# Patient Record
Sex: FEMALE
[De-identification: names, ages, dates, MRNs, and addresses within clinical notes are randomized per-mention and may not be internally consistent; named-entity substitution may affect disease eponyms.]

---

## 2018-02-28 ENCOUNTER — HOSPITAL ENCOUNTER (EMERGENCY)
Dept: HOSPITAL 42 - ED | Age: 38
Discharge: LEFT BEFORE DISCHARGE | End: 2018-02-28
Payer: COMMERCIAL

## 2018-02-28 VITALS — TEMPERATURE: 98.1 F | HEART RATE: 80 BPM

## 2018-02-28 VITALS
RESPIRATION RATE: 16 BRPM | SYSTOLIC BLOOD PRESSURE: 128 MMHG | OXYGEN SATURATION: 100 % | DIASTOLIC BLOOD PRESSURE: 67 MMHG

## 2018-02-28 VITALS — BODY MASS INDEX: 25.8 KG/M2

## 2018-02-28 DIAGNOSIS — M79.89: ICD-10-CM

## 2018-02-28 DIAGNOSIS — R07.9: Primary | ICD-10-CM

## 2018-02-28 LAB
ALBUMIN SERPL-MCNC: 4 G/DL (ref 3–4.8)
ALBUMIN/GLOB SERPL: 1.3 {RATIO} (ref 1.1–1.8)
ALT SERPL-CCNC: 36 U/L (ref 7–56)
APPEARANCE UR: CLEAR
APTT BLD: 27 SECONDS (ref 25.1–36.5)
AST SERPL-CCNC: 42 U/L (ref 14–36)
BASOPHILS # BLD AUTO: 0.04 K/MM3 (ref 0–2)
BASOPHILS NFR BLD: 0.6 % (ref 0–3)
BILIRUB UR-MCNC: NEGATIVE MG/DL
BUN SERPL-MCNC: 24 MG/DL (ref 7–21)
CALCIUM SERPL-MCNC: 9.9 MG/DL (ref 8.4–10.5)
COLOR UR: (no result)
D DIMER PPP FEU-MCNC: 238 NG/ML (ref 0–243)
EOSINOPHIL # BLD: 0.1 10*3/UL (ref 0–0.7)
EOSINOPHIL NFR BLD: 2 % (ref 1.5–5)
ERYTHROCYTE [DISTWIDTH] IN BLOOD BY AUTOMATED COUNT: 14.3 % (ref 11.5–14.5)
GFR NON-AFRICAN AMERICAN: > 60
GLUCOSE UR STRIP-MCNC: NEGATIVE MG/DL
GRANULOCYTES # BLD: 4.98 10*3/UL (ref 1.4–6.5)
GRANULOCYTES NFR BLD: 72.8 % (ref 50–68)
HGB BLD-MCNC: 11.3 G/DL (ref 12–16)
INR PPP: 0.97 (ref 0.93–1.08)
LEUKOCYTE ESTERASE UR-ACNC: NEGATIVE LEU/UL
LYMPHOCYTES # BLD: 1.5 10*3/UL (ref 1.2–3.4)
LYMPHOCYTES NFR BLD AUTO: 21.5 % (ref 22–35)
MCH RBC QN AUTO: 27 PG (ref 25–35)
MCHC RBC AUTO-ENTMCNC: 32.7 G/DL (ref 31–37)
MCV RBC AUTO: 82.8 FL (ref 80–105)
MONOCYTES # BLD AUTO: 0.2 10*3/UL (ref 0.1–0.6)
MONOCYTES NFR BLD: 3.1 % (ref 1–6)
PH UR STRIP: 7.5 [PH] (ref 4.7–8)
PLATELET # BLD: 167 10^3/UL (ref 120–450)
PMV BLD AUTO: 12.5 FL (ref 7–11)
PROT UR STRIP-MCNC: NEGATIVE MG/DL
PROTHROMBIN TIME: 11.1 SECONDS (ref 9.4–12.5)
RBC # BLD AUTO: 4.18 10^6/UL (ref 3.5–6.1)
RBC # UR STRIP: NEGATIVE /UL
SP GR UR STRIP: 1.01 (ref 1–1.03)
TROPONIN I SERPL-MCNC: < 0.01 NG/ML
URINE NITRATE: NEGATIVE
UROBILINOGEN UR STRIP-ACNC: 0.2 E.U./DL
WBC # BLD AUTO: 6.8 10^3/UL (ref 4.5–11)
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
## 2018-03-01 NOTE — CARD
--------------- APPROVED REPORT --------------





EKG Measurement

Heart [id number]

[id number]

[id number]

[id number]

[id number]



<Conclusion>

Normal sinus rhythm

Normal ECG ambulatory

## 2018-03-01 NOTE — US
PROCEDURE:  Left lower extremity venous US



HISTORY:

Leg pain and swelling. Evaluate for DVT.



PHYSICIAN(S):  Ronald Torrez MD.



TECHNIQUE:

Duplex sonography and color-flow Doppler with graded compression were 

used to evaluate the deep venous system of the left lower extremity. 



FINDINGS:

The visualized deep venous system of the left lower extremity is 

sonographically normal and compressible. Normal wave forms and 

augmentation are seen. There is no sonographic evidence for deep 

venous thrombosis in the visualized segments of the left lower 

extremity.



IMPRESSION:

1. No sonographic evidence for deep venous thrombosis in the 

visualized segments of the left lower extremity.

## 2018-04-12 ENCOUNTER — HOSPITAL ENCOUNTER (EMERGENCY)
Dept: HOSPITAL 42 - ED | Age: 38
LOS: 1 days | Discharge: HOME | End: 2018-04-13
Payer: COMMERCIAL

## 2018-04-12 VITALS — BODY MASS INDEX: 28.2 KG/M2

## 2018-04-12 DIAGNOSIS — R10.31: Primary | ICD-10-CM

## 2018-04-12 LAB
ALBUMIN SERPL-MCNC: 3.6 G/DL (ref 3–4.8)
ALBUMIN/GLOB SERPL: 1.3 {RATIO} (ref 1.1–1.8)
ALT SERPL-CCNC: 28 U/L (ref 7–56)
APPEARANCE UR: CLEAR
AST SERPL-CCNC: 30 U/L (ref 14–36)
BASOPHILS # BLD AUTO: 0.03 K/MM3 (ref 0–2)
BASOPHILS NFR BLD: 0.4 % (ref 0–3)
BILIRUB UR-MCNC: NEGATIVE MG/DL
BUN SERPL-MCNC: 20 MG/DL (ref 7–21)
CALCIUM SERPL-MCNC: 9.5 MG/DL (ref 8.4–10.5)
COLOR UR: (no result)
EOSINOPHIL # BLD: 0.2 10*3/UL (ref 0–0.7)
EOSINOPHIL NFR BLD: 3.3 % (ref 1.5–5)
ERYTHROCYTE [DISTWIDTH] IN BLOOD BY AUTOMATED COUNT: 14.2 % (ref 11.5–14.5)
GFR NON-AFRICAN AMERICAN: > 60
GLUCOSE UR STRIP-MCNC: NEGATIVE MG/DL
GRANULOCYTES # BLD: 4.89 10*3/UL (ref 1.4–6.5)
GRANULOCYTES NFR BLD: 66.5 % (ref 50–68)
HCG,QUALITATIVE URINE: NEGATIVE
HGB BLD-MCNC: 11.4 G/DL (ref 12–16)
LEUKOCYTE ESTERASE UR-ACNC: NEGATIVE LEU/UL
LIPASE SERPL-CCNC: 83 U/L (ref 23–300)
LYMPHOCYTES # BLD: 1.8 10*3/UL (ref 1.2–3.4)
LYMPHOCYTES NFR BLD AUTO: 24 % (ref 22–35)
MCH RBC QN AUTO: 26.3 PG (ref 25–35)
MCHC RBC AUTO-ENTMCNC: 33 G/DL (ref 31–37)
MCV RBC AUTO: 79.7 FL (ref 80–105)
MONOCYTES # BLD AUTO: 0.4 10*3/UL (ref 0.1–0.6)
MONOCYTES NFR BLD: 5.8 % (ref 1–6)
PH UR STRIP: 7 [PH] (ref 4.7–8)
PLATELET # BLD: 161 10^3/UL (ref 120–450)
PMV BLD AUTO: 12.1 FL (ref 7–11)
PROT UR STRIP-MCNC: NEGATIVE MG/DL
RBC # BLD AUTO: 4.33 10^6/UL (ref 3.5–6.1)
RBC # UR STRIP: NEGATIVE /UL
SP GR UR STRIP: 1.01 (ref 1–1.03)
UROBILINOGEN UR STRIP-ACNC: 0.2 E.U./DL
WBC # BLD AUTO: 7.4 10^3/UL (ref 4.5–11)

## 2018-04-12 PROCEDURE — 83690 ASSAY OF LIPASE: CPT

## 2018-04-12 PROCEDURE — 96375 TX/PRO/DX INJ NEW DRUG ADDON: CPT

## 2018-04-12 PROCEDURE — 74176 CT ABD & PELVIS W/O CONTRAST: CPT

## 2018-04-12 PROCEDURE — 81003 URINALYSIS AUTO W/O SCOPE: CPT

## 2018-04-12 PROCEDURE — 99283 EMERGENCY DEPT VISIT LOW MDM: CPT

## 2018-04-12 PROCEDURE — 85025 COMPLETE CBC W/AUTO DIFF WBC: CPT

## 2018-04-12 PROCEDURE — 96361 HYDRATE IV INFUSION ADD-ON: CPT

## 2018-04-12 PROCEDURE — 96374 THER/PROPH/DIAG INJ IV PUSH: CPT

## 2018-04-12 PROCEDURE — 84703 CHORIONIC GONADOTROPIN ASSAY: CPT

## 2018-04-12 PROCEDURE — 80053 COMPREHEN METABOLIC PANEL: CPT

## 2018-04-12 NOTE — CT
EXAM:

  CT Abdomen and Pelvis Without Intravenous Contrast



CLINICAL HISTORY:

  38 years old, female; Pain; Abdominal pain; Localized; Right lower quadrant 

(rlq); Prior surgery; Surgery type: (3) c-sections; Additional info: Rlq pain 

R/O appy



TECHNIQUE:

  Axial computed tomography images of the abdomen and pelvis without 

intravenous contrast.  All CT scans at this facility use one or more dose 

reduction techniques, viz.: automated exposure control; ma/kV adjustment per 

patient size (including targeted exams where dose is matched to indication; 

i.e. head); or iterative reconstruction technique.

  Coronal and sagittal reformatted images were created and reviewed.



COMPARISON:

  No relevant prior studies available.



FINDINGS:

  Lung bases:  Unremarkable.  No mass.  No consolidation.



 ABDOMEN:

  Liver:  The liver is within normal limits for this noncontrast study.

  Gallbladder and bile ducts:  Unremarkable.  No calcified stones.  No ductal 

dilation.

  Pancreas:  Unremarkable.  No ductal dilation.

  Spleen:  Unremarkable.  No splenomegaly.

  Adrenals:  Unremarkable.  No mass.

  Kidneys and ureters:  Unremarkable.  No obstructing stones.  No 

hydronephrosis.

  Stomach and bowel:  There is no wall thickening or pericolonic stranding to 

suggest colitis.  No obstruction.

  Appendix:  A normal appendix is identified.



 PELVIS:

  Bladder:  Unremarkable.  No stones.

  Reproductive:  Unremarkable as visualized.



 ABDOMEN and PELVIS:

  Intraperitoneal space: Possible trace pelvic cul-de-sac free fluid. No free 

air.  No significant fluid collection.

  Bones/joints:  No acute fracture.  No dislocation.

  Soft tissues:  Unremarkable.

  Vasculature:  Unremarkable.  No abdominal aortic aneurysm.

  Lymph nodes:  Unremarkable.  No enlarged lymph nodes.



IMPRESSION:     

No evidence of an acute intra-abdominal or pelvic abnormality. 



No evidence of acute appendicitis.



Possible trace physiologic pelvic cul-de-sac free fluid.

## 2018-04-12 NOTE — ED PDOC
Arrival/HPI





<Navjot Cordon - Last Filed: 18 22:24>





- General


Historian: Patient





- History of Present Illness


Time/Duration: Other (see hpi)


Context: Home





<Sascha Patel - Last Filed: 18 00:00>





- General


Chief Complaint: Abdominal Pain


Time Seen by Provider: 18 19:07





- History of Present Illness


Narrative History of Present Illness (Text): 





18 19:07


Patient is not in her room.


18 19:20


This 37 yo female who denies pmh presents to this ED c/o generalized abdominal 

pain, urinary frequency since this morning.  Patient denies diarrhea, recent 

travel, sick contact, dysuria, hematuria, weakness, paresthesias, or abnormal 

gait.


 (Sascha Patel)





Past Medical History





- Provider Review


Nursing Documentation Reviewed: Yes





- Cardiac


Hx Cardiac Disorders: No





- Pulmonary


Hx Respiratory Disorders: No





- Neurological


Hx Neurological Disorder: No





- HEENT


Hx HEENT Disorder: No





- Renal


Hx Renal Disorder: No





- Endocrine/Metabolic


Hx Endocrine Disorders: No





- Hematological/Oncological


Hx Blood Disorders: No





- Integumentary


Hx Dermatological Disorder: No





- Musculoskeletal/Rheumatological


Hx Musculoskeletal Disorders: No





- Gastrointestinal


Hx Gastrointestinal Disorders: No





- Genitourinary/Gynecological


Hx Genitourinary Disorders: No





- Psychiatric


Hx Psychophysiologic Disorder: No


Hx Substance Use: No





- Surgical History


Hx  Section: Yes (x3)





- Anesthesia


Hx Anesthesia: Yes


Hx Anesthesia Reactions: No


Hx Malignant Hyperthermia: No





<Sascha Patel - Last Filed: 18 00:00>





Family/Social History





- Physician Review


Nursing Documentation Reviewed: Yes


Family/Social History: Other (noncontributory)


Smoking Status: Never Smoked


Hx Alcohol Use: No


Hx Substance Use: No





<Sascha Patel - Last Filed: 18 00:00>





Allergies/Home Meds





<Navjot Cordon - Last Filed: 18 22:24>





<Sascha Patel - Last Filed: 18 00:00>


Allergies/Adverse Reactions: 


Allergies





methylprednisolone Allergy (Verified 18 18:38)


 ANAPHYLAXIS








Home Medications: 


 Home Meds











 Medication  Instructions  Recorded  Confirmed


 


Ibuprofen [Motrin Tab] 800 mg PO PRN PRN 18














Review of Systems





- Review of Systems


Constitutional: Normal.  absent: Fatigue, Weight Change, Fevers


Eyes: Normal


ENT: Normal


Respiratory: Normal.  absent: SOB, Cough


Cardiovascular: Normal.  absent: Chest Pain, Palpitations


Gastrointestinal: Abdominal Pain, Nausea, Vomiting, Other (no rectal bleeding).

  absent: Constipation, Diarrhea


Genitourinary Female: Frequency, Hematuria.  absent: Dysuria, Vaginal Bleeding, 

Vaginal Discharge


Musculoskeletal: Normal.  absent: Back Pain


Skin: Normal.  absent: Rash


Neurological: Normal.  absent: Headache, Dizziness, Focal Weakness, Gait Changes

, Speech Changes, Facial Droop, Disequilibrium, Seizure


Endocrine: Normal


Hemo/Lymphatic: Normal


Psychiatric: Normal





<Patel,Nah P - Last Filed: 18 00:00>





Physical Exam


Temperature: Afebrile


Blood Pressure: Normal


Pulse: Regular


Respiratory Rate: Normal


Appearance: Positive for: Well-Appearing, Non-Toxic, Comfortable


Pain Distress: None


Mental Status: Positive for: Alert and Oriented X 3





- Systems Exam


Head: Present: Atraumatic, Normocephalic


Pupils: Present: PERRL


Extroacular Muscles: Present: EOMI


Conjunctiva: Present: Normal


Mouth: Present: Moist Mucous Membranes


Neck: Present: Normal Range of Motion


Respiratory/Chest: Present: Clear to Auscultation, Good Air Exchange.  No: 

Respiratory Distress, Accessory Muscle Use


Cardiovascular: Present: Regular Rate and Rhythm, Normal S1, S2.  No: Murmurs


Abdomen: Present: Tenderness (mild right lower quadrant abdominal tenderness).  

No: Distention, Peritoneal Signs, Rebound, Guarding, Hernias


Back: Present: Normal Inspection.  No: CVA Tenderness


Upper Extremity: Present: Normal Inspection, Normal ROM.  No: Cyanosis, Edema


Lower Extremity: Present: Normal Inspection, Normal ROM.  No: Edema


Neurological: Present: GCS=15, CN II-XII Intact, Speech Normal


Skin: Present: Warm, Dry, Normal Color.  No: Rashes


Psychiatric: Present: Alert, Oriented x 3, Normal Insight, Normal Concentration





<Patel,Nahim P - Last Filed: 18 00:00>


Vital Signs











  Temp Pulse Resp BP Pulse Ox


 


 18 00:15  97.9 F  88  16  122/80  99


 


 18 22:05   88  16  112/80  99


 


 18 20:15   82  16  120/70  99


 


 18 18:39  98.3 F  99 H  19  110/60  98














Medical Decision Making





<Navjot Cordon - Last Filed: 18 22:24>


Re-evaluation Time: 00:02


Reassessment Condition: Re-examined, Improved





- Lab Interpretations


I have reviewed the lab results: Yes


Interpretation: No clinic. lab abnormalty





<Sascha Patel - Last Filed: 18 00:00>


ED Course and Treatment: 





18 00:02


Re-evaluation.  Patient feels better.  Discussed results and plan with patient 

who expresses understanding.  All questions answered and there is agreement 

with the plan to discharge home with instructions.  Patient stable for 

discharge.  Return if symptoms persist or worsen.


Abdomen is soft, nt/nd.  Patient remained stable during the course of ED visit.

  Patient tolerate po fluids.  Patient was recommended to f/u pmd in 1-2 days, 

and to return to ED if symptoms worsen. (Sascha Patel)





- Lab Interpretations


Lab Results: 








 18 20:07 





 18 20:07 





 Lab Results





18 20:07: Sodium 136, Potassium 3.8, Chloride 105, Carbon Dioxide 24, 

Anion Gap 10, BUN 20, Creatinine 0.6 L, Est GFR ( Amer) > 60, Est GFR (

Non-Af Amer) > 60, Random Glucose 85, Calcium 9.5, Total Bilirubin < 0.1 L, AST 

30, ALT 28, Alkaline Phosphatase 83, Total Protein 6.5, Albumin 3.6, Globulin 

2.9, Albumin/Globulin Ratio 1.3, Lipase 83


18 20:07: WBC 7.4, RBC 4.33, Hgb 11.4 L, Hct 34.5 L, MCV 79.7 L D, MCH 

26.3, MCHC 33.0, RDW 14.2, Plt Count 161, MPV 12.1 H, Gran % 66.5, Lymph % (Auto

) 24.0, Mono % (Auto) 5.8, Eos % (Auto) 3.3, Baso % (Auto) 0.4, Gran # 4.89, 

Lymph # (Auto) 1.8, Mono # (Auto) 0.4, Eos # (Auto) 0.2, Baso # (Auto) 0.03


18 19:40: Urine Color Light yellow, Urine Appearance Clear, Urine pH 7.0, 

Ur Specific Gravity 1.010, Urine Protein Negative, Urine Glucose (UA) Negative, 

Urine Ketones Negative, Urine Blood Negative, Urine Nitrate Negative, Urine 

Bilirubin Negative, Urine Urobilinogen 0.2, Ur Leukocyte Esterase Negative, 

Urine HCG, Qual Negative











- RAD Interpretation


Narrative RAD Interpretations (Text): 





18 23:57


[facility] Division of Radiology  


 [address] [place] [address]  


 Tel. no. [phone]   


 


 


Patient Name: GUILLAUME CEDENO            Account #: [id number]  


 


 FINDINGS:  


   Lung bases:  Unremarkable.  No mass.  No consolidation.  


 


  ABDOMEN:  


   Liver:  The liver is within normal limits for this noncontrast study.  


   Gallbladder and bile ducts:  Unremarkable.  No calcified stones.  No ductal 

  


 dilation.  


   Pancreas:  Unremarkable.  No ductal dilation.  


   Spleen:  Unremarkable.  No splenomegaly.  


   Adrenals:  Unremarkable.  No mass.  


   Kidneys and ureters:  Unremarkable.  No obstructing stones.  No   


 hydronephrosis.  


   Stomach and bowel:  There is no wall thickening or pericolonic stranding to 

  


 suggest colitis.  No obstruction.  


   Appendix:  A normal appendix is identified.  


 


  PELVIS:  


   Bladder:  Unremarkable.  No stones.  


   Reproductive:  Unremarkable as visualized.  


 


  ABDOMEN and PELVIS:  


   Intraperitoneal space: Possible trace pelvic cul-de-sac free fluid. No free 

  


 air.  No significant fluid collection.  


   Bones/joints:  No acute fracture.  No dislocation.  


   Soft tissues:  Unremarkable.  


   Vasculature:  Unremarkable.  No abdominal aortic aneurysm.  


   Lymph nodes:  Unremarkable.  No enlarged lymph nodes.  


 


 IMPRESSION:       


 No evidence of an acute intra-abdominal or pelvic abnormality.   


 


 No evidence of acute appendicitis.  


 


 Possible trace physiologic pelvic cul-de-sac free fluid.  


  (Sascha Patel)


Radiology Orders: 








18 20:02


ABD & PELVIS W/O PO OR IV CONT [CT] Stat 














- Medication Orders


Current Medication Orders: 











Discontinued Medications





Famotidine (Pepcid)  20 mg IVP STAT STA


   Stop: 18 19:33


   Last Admin: 18 20:08  Dose: 20 mg





IVP Administration


 Document     18 20:08  MS  (Rec: 04/12/18 20:08  MS  [id number])


     Charges for Administration


      # of IVP Administrations                   1





Sodium Chloride (Sodium Chloride 0.9%)  1,000 mls @ 1,000 mls/hr IV .Q1H STA


   Stop: 18 20:31


   Last Admin: 18 20:06  Dose: 1,000 mls/hr





eMAR Start Stop


 Document     18 20:06  MS  (Rec: 18 20:08  MS  [id number])


     Intravenous Solution


      Start Date                                 18


      Start Time                                 20:08


      End Date                                   18


      End time                                   21:08


      Total Infusion Time                        60





Ketorolac Tromethamine (Toradol)  15 mg IVP STAT STA


   Stop: 18 19:37


   Last Admin: 18 20:08  Dose: 15 mg





MAR Pain Assessment


 Document     18 20:08  MS  (Rec: 18 20:08  MS  [id number])


     Pain Reassessment


      Is this a pain reassessment?               No


     Sleep


      Is patient sleeping during reassessment?   No


     Presence of Pain


      Presence of Pain                           Yes


     Pain Scale Used


      Pain Scale Used                            Numeric


     Location


      Upper or Lower                             Lower


      Pain Location Body Site                    Abdomen


     Description


      Description                                Intermittent


      Intensity of Pain at present               6


      Pain Behavior                              Withdrawal from Touch


                                                 Rubbing Site


IVP Administration


 Document     18 20:08  MS  (Rec: 18 20:08  MS  [id number])


     Charges for Administration


      # of IVP Administrations                   1





Ondansetron HCl (Zofran Inj)  4 mg IVP STAT STA


   Stop: 18 19:33


   Last Admin: 18 20:08  Dose: 4 mg





IVP Administration


 Document     18 20:08  MS  (Rec: 18 20:09  [id number])


     Charges for Administration


      # of IVP Administrations                   1














- PA / NP / Resident Statement


MD/ has reviewed & agrees with the documentation as recorded.


MD/ has examined the patient and agrees with the treatment plan.





<Navjot Cordon - Last Filed: 18 22:24>





Disposition/Present on Arrival





<Navjot Cordon - Last Filed: 18 22:24>





- Present on Arrival


Any Indicators Present on Arrival: No


History of DVT/PE: No


History of Uncontrolled Diabetes: No


Urinary Catheter: No


History of Decub. Ulcer: No


History Surgical Site Infection Following: None





- Disposition


Have Diagnosis and Disposition been Completed?: Yes


Disposition Time: 00:02


Patient Plan: Discharge





<Sascha Patel - Last Filed: 18 00:00>





- Disposition


Diagnosis: 


 Nonspecific abdominal pain





Disposition: HOME/ ROUTINE


Condition: GOOD


Discharge Instructions (ExitCare):  Acute Abdomen (Belly Pain), Adult (DC)


Additional Instructions: 


Call private doctor for follow up visit in 1- 2 days.  Take medication as 

instructed.  Return to emergency if symptoms worsen.  Have liquid diet for a 

couple of days.


Prescriptions: 


Dicyclomine [Dicyclomine HCl] 10 mg PO TID PRN #20 cap


 PRN Reason: Gi Distress


Famotidine [Pepcid] 40 mg PO DAILY #10 tablet


Referrals: 


Makenzie Otoole MD [Primary Care Provider] - Follow up with primary


Forms:  [facility] (English), WORK NOTE

## 2018-04-13 VITALS — OXYGEN SATURATION: 99 % | RESPIRATION RATE: 16 BRPM

## 2018-04-13 VITALS — HEART RATE: 88 BPM | TEMPERATURE: 97.9 F | SYSTOLIC BLOOD PRESSURE: 122 MMHG | DIASTOLIC BLOOD PRESSURE: 80 MMHG

## 2023-12-31 ENCOUNTER — APPOINTMENT (EMERGENCY)
Dept: CT IMAGING | Facility: HOSPITAL | Age: 43
End: 2023-12-31

## 2023-12-31 ENCOUNTER — HOSPITAL ENCOUNTER (EMERGENCY)
Facility: HOSPITAL | Age: 43
Discharge: HOME | End: 2023-12-31
Attending: EMERGENCY MEDICINE

## 2023-12-31 VITALS
RESPIRATION RATE: 18 BRPM | HEART RATE: 76 BPM | WEIGHT: 153.66 LBS | WEIGHT: 153.66 LBS | HEART RATE: 76 BPM | TEMPERATURE: 98 F | DIASTOLIC BLOOD PRESSURE: 63 MMHG | RESPIRATION RATE: 18 BRPM | DIASTOLIC BLOOD PRESSURE: 63 MMHG | OXYGEN SATURATION: 97 % | SYSTOLIC BLOOD PRESSURE: 108 MMHG | OXYGEN SATURATION: 97 % | TEMPERATURE: 98 F | SYSTOLIC BLOOD PRESSURE: 108 MMHG

## 2023-12-31 DIAGNOSIS — N23 RENAL COLIC ON LEFT SIDE: ICD-10-CM

## 2023-12-31 DIAGNOSIS — D21.9 FIBROID: ICD-10-CM

## 2023-12-31 DIAGNOSIS — N39.0 UTI (URINARY TRACT INFECTION): Primary | ICD-10-CM

## 2023-12-31 DIAGNOSIS — R11.0 NAUSEA: ICD-10-CM

## 2023-12-31 LAB
ALBUMIN SERPL BCP-MCNC: 3.9 G/DL (ref 3.5–5)
ALBUMIN SERPL BCP-MCNC: 3.9 G/DL (ref 3.5–5)
ALP SERPL-CCNC: 54 U/L (ref 34–104)
ALP SERPL-CCNC: 54 U/L (ref 34–104)
ALT SERPL W P-5'-P-CCNC: 16 U/L (ref 7–52)
ALT SERPL W P-5'-P-CCNC: 16 U/L (ref 7–52)
ANION GAP SERPL CALCULATED.3IONS-SCNC: 6 MMOL/L
ANION GAP SERPL CALCULATED.3IONS-SCNC: 6 MMOL/L
AST SERPL W P-5'-P-CCNC: 16 U/L (ref 13–39)
AST SERPL W P-5'-P-CCNC: 16 U/L (ref 13–39)
BACTERIA UR QL AUTO: ABNORMAL /HPF
BACTERIA UR QL AUTO: ABNORMAL /HPF
BASOPHILS # BLD AUTO: 0.04 THOUSANDS/ÂΜL (ref 0–0.1)
BASOPHILS # BLD AUTO: 0.04 THOUSANDS/ÂΜL (ref 0–0.1)
BASOPHILS NFR BLD AUTO: 1 % (ref 0–1)
BASOPHILS NFR BLD AUTO: 1 % (ref 0–1)
BILIRUB SERPL-MCNC: 0.28 MG/DL (ref 0.2–1)
BILIRUB SERPL-MCNC: 0.28 MG/DL (ref 0.2–1)
BILIRUB UR QL STRIP: NEGATIVE
BILIRUB UR QL STRIP: NEGATIVE
BUN SERPL-MCNC: 16 MG/DL (ref 5–25)
BUN SERPL-MCNC: 16 MG/DL (ref 5–25)
CALCIUM SERPL-MCNC: 9.3 MG/DL (ref 8.4–10.2)
CALCIUM SERPL-MCNC: 9.3 MG/DL (ref 8.4–10.2)
CHLORIDE SERPL-SCNC: 105 MMOL/L (ref 96–108)
CHLORIDE SERPL-SCNC: 105 MMOL/L (ref 96–108)
CLARITY UR: CLEAR
CLARITY UR: CLEAR
CO2 SERPL-SCNC: 25 MMOL/L (ref 21–32)
CO2 SERPL-SCNC: 25 MMOL/L (ref 21–32)
COLOR UR: ABNORMAL
COLOR UR: ABNORMAL
CREAT SERPL-MCNC: 0.64 MG/DL (ref 0.6–1.3)
CREAT SERPL-MCNC: 0.64 MG/DL (ref 0.6–1.3)
EOSINOPHIL # BLD AUTO: 0.12 THOUSAND/ÂΜL (ref 0–0.61)
EOSINOPHIL # BLD AUTO: 0.12 THOUSAND/ÂΜL (ref 0–0.61)
EOSINOPHIL NFR BLD AUTO: 2 % (ref 0–6)
EOSINOPHIL NFR BLD AUTO: 2 % (ref 0–6)
ERYTHROCYTE [DISTWIDTH] IN BLOOD BY AUTOMATED COUNT: 14.6 % (ref 11.6–15.1)
ERYTHROCYTE [DISTWIDTH] IN BLOOD BY AUTOMATED COUNT: 14.6 % (ref 11.6–15.1)
EXT PREGNANCY TEST URINE: NEGATIVE
EXT PREGNANCY TEST URINE: NEGATIVE
EXT. CONTROL: NORMAL
EXT. CONTROL: NORMAL
GFR SERPL CREATININE-BSD FRML MDRD: 110 ML/MIN/1.73SQ M
GFR SERPL CREATININE-BSD FRML MDRD: 110 ML/MIN/1.73SQ M
GLUCOSE SERPL-MCNC: 82 MG/DL (ref 65–140)
GLUCOSE SERPL-MCNC: 82 MG/DL (ref 65–140)
GLUCOSE UR STRIP-MCNC: ABNORMAL MG/DL
GLUCOSE UR STRIP-MCNC: ABNORMAL MG/DL
HCT VFR BLD AUTO: 33.8 % (ref 34.8–46.1)
HCT VFR BLD AUTO: 33.8 % (ref 34.8–46.1)
HGB BLD-MCNC: 10.8 G/DL (ref 11.5–15.4)
HGB BLD-MCNC: 10.8 G/DL (ref 11.5–15.4)
HGB UR QL STRIP.AUTO: NEGATIVE
HGB UR QL STRIP.AUTO: NEGATIVE
IMM GRANULOCYTES # BLD AUTO: 0.02 THOUSAND/UL (ref 0–0.2)
IMM GRANULOCYTES # BLD AUTO: 0.02 THOUSAND/UL (ref 0–0.2)
IMM GRANULOCYTES NFR BLD AUTO: 0 % (ref 0–2)
IMM GRANULOCYTES NFR BLD AUTO: 0 % (ref 0–2)
KETONES UR STRIP-MCNC: ABNORMAL MG/DL
KETONES UR STRIP-MCNC: ABNORMAL MG/DL
LEUKOCYTE ESTERASE UR QL STRIP: NEGATIVE
LEUKOCYTE ESTERASE UR QL STRIP: NEGATIVE
LIPASE SERPL-CCNC: 26 U/L (ref 11–82)
LIPASE SERPL-CCNC: 26 U/L (ref 11–82)
LYMPHOCYTES # BLD AUTO: 1.36 THOUSANDS/ÂΜL (ref 0.6–4.47)
LYMPHOCYTES # BLD AUTO: 1.36 THOUSANDS/ÂΜL (ref 0.6–4.47)
LYMPHOCYTES NFR BLD AUTO: 18 % (ref 14–44)
LYMPHOCYTES NFR BLD AUTO: 18 % (ref 14–44)
MCH RBC QN AUTO: 25.9 PG (ref 26.8–34.3)
MCH RBC QN AUTO: 25.9 PG (ref 26.8–34.3)
MCHC RBC AUTO-ENTMCNC: 32 G/DL (ref 31.4–37.4)
MCHC RBC AUTO-ENTMCNC: 32 G/DL (ref 31.4–37.4)
MCV RBC AUTO: 81 FL (ref 82–98)
MCV RBC AUTO: 81 FL (ref 82–98)
MONOCYTES # BLD AUTO: 0.62 THOUSAND/ÂΜL (ref 0.17–1.22)
MONOCYTES # BLD AUTO: 0.62 THOUSAND/ÂΜL (ref 0.17–1.22)
MONOCYTES NFR BLD AUTO: 8 % (ref 4–12)
MONOCYTES NFR BLD AUTO: 8 % (ref 4–12)
MUCOUS THREADS UR QL AUTO: ABNORMAL
MUCOUS THREADS UR QL AUTO: ABNORMAL
NEUTROPHILS # BLD AUTO: 5.63 THOUSANDS/ÂΜL (ref 1.85–7.62)
NEUTROPHILS # BLD AUTO: 5.63 THOUSANDS/ÂΜL (ref 1.85–7.62)
NEUTS SEG NFR BLD AUTO: 71 % (ref 43–75)
NEUTS SEG NFR BLD AUTO: 71 % (ref 43–75)
NITRITE UR QL STRIP: POSITIVE
NITRITE UR QL STRIP: POSITIVE
NON-SQ EPI CELLS URNS QL MICRO: ABNORMAL /HPF
NON-SQ EPI CELLS URNS QL MICRO: ABNORMAL /HPF
NRBC BLD AUTO-RTO: 0 /100 WBCS
NRBC BLD AUTO-RTO: 0 /100 WBCS
PH UR STRIP.AUTO: 7 [PH] (ref 4.5–8)
PH UR STRIP.AUTO: 7 [PH] (ref 4.5–8)
PLATELET # BLD AUTO: 149 THOUSANDS/UL (ref 149–390)
PLATELET # BLD AUTO: 149 THOUSANDS/UL (ref 149–390)
PMV BLD AUTO: 12.3 FL (ref 8.9–12.7)
PMV BLD AUTO: 12.3 FL (ref 8.9–12.7)
POTASSIUM SERPL-SCNC: 3.6 MMOL/L (ref 3.5–5.3)
POTASSIUM SERPL-SCNC: 3.6 MMOL/L (ref 3.5–5.3)
PROT SERPL-MCNC: 6.3 G/DL (ref 6.4–8.4)
PROT SERPL-MCNC: 6.3 G/DL (ref 6.4–8.4)
PROT UR STRIP-MCNC: ABNORMAL MG/DL
PROT UR STRIP-MCNC: ABNORMAL MG/DL
RBC # BLD AUTO: 4.17 MILLION/UL (ref 3.81–5.12)
RBC # BLD AUTO: 4.17 MILLION/UL (ref 3.81–5.12)
RBC #/AREA URNS AUTO: ABNORMAL /HPF
RBC #/AREA URNS AUTO: ABNORMAL /HPF
SODIUM SERPL-SCNC: 136 MMOL/L (ref 135–147)
SODIUM SERPL-SCNC: 136 MMOL/L (ref 135–147)
SP GR UR STRIP.AUTO: 1.02 (ref 1–1.03)
SP GR UR STRIP.AUTO: 1.02 (ref 1–1.03)
UROBILINOGEN UR QL STRIP.AUTO: 1 E.U./DL
UROBILINOGEN UR QL STRIP.AUTO: 1 E.U./DL
WBC # BLD AUTO: 7.79 THOUSAND/UL (ref 4.31–10.16)
WBC # BLD AUTO: 7.79 THOUSAND/UL (ref 4.31–10.16)
WBC #/AREA URNS AUTO: ABNORMAL /HPF
WBC #/AREA URNS AUTO: ABNORMAL /HPF

## 2023-12-31 PROCEDURE — 74177 CT ABD & PELVIS W/CONTRAST: CPT

## 2023-12-31 PROCEDURE — 81025 URINE PREGNANCY TEST: CPT | Performed by: EMERGENCY MEDICINE

## 2023-12-31 PROCEDURE — 36415 COLL VENOUS BLD VENIPUNCTURE: CPT | Performed by: EMERGENCY MEDICINE

## 2023-12-31 PROCEDURE — 96365 THER/PROPH/DIAG IV INF INIT: CPT

## 2023-12-31 PROCEDURE — 99283 EMERGENCY DEPT VISIT LOW MDM: CPT

## 2023-12-31 PROCEDURE — 81001 URINALYSIS AUTO W/SCOPE: CPT

## 2023-12-31 PROCEDURE — 96375 TX/PRO/DX INJ NEW DRUG ADDON: CPT

## 2023-12-31 PROCEDURE — G1004 CDSM NDSC: HCPCS

## 2023-12-31 PROCEDURE — 83690 ASSAY OF LIPASE: CPT | Performed by: EMERGENCY MEDICINE

## 2023-12-31 PROCEDURE — 85025 COMPLETE CBC W/AUTO DIFF WBC: CPT | Performed by: EMERGENCY MEDICINE

## 2023-12-31 PROCEDURE — 80053 COMPREHEN METABOLIC PANEL: CPT | Performed by: EMERGENCY MEDICINE

## 2023-12-31 PROCEDURE — 99285 EMERGENCY DEPT VISIT HI MDM: CPT | Performed by: EMERGENCY MEDICINE

## 2023-12-31 RX ORDER — IBUPROFEN 600 MG/1
600 TABLET ORAL EVERY 8 HOURS PRN
Qty: 15 TABLET | Refills: 0 | Status: SHIPPED | OUTPATIENT
Start: 2023-12-31

## 2023-12-31 RX ORDER — ONDANSETRON 2 MG/ML
4 INJECTION INTRAMUSCULAR; INTRAVENOUS ONCE
Status: COMPLETED | OUTPATIENT
Start: 2023-12-31 | End: 2023-12-31

## 2023-12-31 RX ORDER — ONDANSETRON 4 MG/1
4 TABLET, ORALLY DISINTEGRATING ORAL EVERY 8 HOURS PRN
Qty: 10 TABLET | Refills: 0 | Status: SHIPPED | OUTPATIENT
Start: 2023-12-31

## 2023-12-31 RX ORDER — KETOROLAC TROMETHAMINE 30 MG/ML
15 INJECTION, SOLUTION INTRAMUSCULAR; INTRAVENOUS ONCE
Status: COMPLETED | OUTPATIENT
Start: 2023-12-31 | End: 2023-12-31

## 2023-12-31 RX ADMIN — ONDANSETRON 4 MG: 2 INJECTION INTRAMUSCULAR; INTRAVENOUS at 12:17

## 2023-12-31 RX ADMIN — SODIUM CHLORIDE, SODIUM LACTATE, POTASSIUM CHLORIDE, AND CALCIUM CHLORIDE 1000 ML: .6; .31; .03; .02 INJECTION, SOLUTION INTRAVENOUS at 12:19

## 2023-12-31 RX ADMIN — KETOROLAC TROMETHAMINE 15 MG: 30 INJECTION, SOLUTION INTRAMUSCULAR; INTRAVENOUS at 12:18

## 2023-12-31 RX ADMIN — IOHEXOL 100 ML: 350 INJECTION, SOLUTION INTRAVENOUS at 12:58

## 2023-12-31 NOTE — Clinical Note
Fiorella David was seen and treated in our emergency department on 12/31/2023.                Diagnosis:     Fiorella  .    She may return on this date: 01/02/2024         If you have any questions or concerns, please don't hesitate to call.      Marquise Dewitt MD    ______________________________           _______________          _______________  Hospital Representative                              Date                                Time

## 2023-12-31 NOTE — ED PROVIDER NOTES
History  Chief Complaint   Patient presents with    Flu Symptoms     Patient has body aches since yesterday along with nausea and dizziness.     HPI  Patient with bodyaches since she states yesterday along with some nausea and feeling lightheaded with decreased p.o. intake.  No vomiting or diarrhea.  No fevers.  She has no upper respiratory symptoms, not shortness of breath and no cough.  Has been having left flank pain was seen at another hospital and external review showed that they treated her for UTI as she has been having some dysuria.  External review of the chart shows the culture from few days ago had no growth.  None       History reviewed. No pertinent past medical history.    History reviewed. No pertinent surgical history.    History reviewed. No pertinent family history.  I have reviewed and agree with the history as documented.    E-Cigarette/Vaping    E-Cigarette Use Never User      E-Cigarette/Vaping Substances     Social History     Tobacco Use    Smoking status: Never    Smokeless tobacco: Never   Vaping Use    Vaping status: Never Used   Substance Use Topics    Alcohol use: Never    Drug use: Never       Review of Systems    Physical Exam  Physical Exam  Vitals and nursing note reviewed.   Constitutional:       General: She is not in acute distress.     Appearance: Normal appearance. She is well-developed. She is not ill-appearing, toxic-appearing or diaphoretic.   HENT:      Head: Normocephalic and atraumatic.      Right Ear: Hearing normal. No drainage or swelling.      Left Ear: Hearing normal. No drainage or swelling.   Eyes:      General: Lids are normal.         Right eye: No discharge.         Left eye: No discharge.      Conjunctiva/sclera: Conjunctivae normal.   Neck:      Vascular: No JVD.      Trachea: Trachea normal.   Cardiovascular:      Rate and Rhythm: Normal rate and regular rhythm.      Pulses: Normal pulses.      Heart sounds: Normal heart sounds. No murmur heard.     No  friction rub. No gallop.   Pulmonary:      Effort: Pulmonary effort is normal. No respiratory distress.      Breath sounds: Normal breath sounds. No stridor. No wheezing or rales.   Chest:      Chest wall: No tenderness.   Abdominal:      Palpations: Abdomen is soft.      Tenderness: There is no abdominal tenderness. There is left CVA tenderness. There is no right CVA tenderness, guarding or rebound.      Comments: Very very mild left CVA tenderness   Musculoskeletal:         General: Normal range of motion.      Cervical back: Normal range of motion.      Right lower leg: No edema.      Left lower leg: No edema.   Skin:     General: Skin is warm and dry.      Coloration: Skin is not pale.      Findings: No rash.   Neurological:      General: No focal deficit present.      Mental Status: She is alert.      GCS: GCS eye subscore is 4. GCS verbal subscore is 5. GCS motor subscore is 6.      Sensory: No sensory deficit.      Motor: No weakness or abnormal muscle tone.   Psychiatric:         Mood and Affect: Mood normal.         Speech: Speech normal.         Behavior: Behavior is cooperative.         Vital Signs  ED Triage Vitals [12/31/23 1116]   Temperature Pulse Respirations Blood Pressure SpO2   98 °F (36.7 °C) 89 18 128/58 98 %      Temp Source Heart Rate Source Patient Position - Orthostatic VS BP Location FiO2 (%)   Oral Monitor Sitting Right arm --      Pain Score       9           Vitals:    12/31/23 1116 12/31/23 1230   BP: 128/58 108/63   Pulse: 89 76   Patient Position - Orthostatic VS: Sitting Sitting         Visual Acuity      ED Medications  Medications   lactated ringers bolus 1,000 mL (0 mL Intravenous Stopped 12/31/23 1325)   ondansetron (ZOFRAN) injection 4 mg (4 mg Intravenous Given 12/31/23 1217)   ketorolac (TORADOL) injection 15 mg (15 mg Intravenous Given 12/31/23 1218)   iohexol (OMNIPAQUE) 350 MG/ML injection (MULTI-DOSE) 100 mL (100 mL Intravenous Given 12/31/23 1258)       Diagnostic  Studies  Results Reviewed       Procedure Component Value Units Date/Time    Comprehensive metabolic panel [[id number]]  (Abnormal) Collected: 12/31/23 1216    Lab Status: Final result Specimen: Blood from Arm, Right Updated: 12/31/23 1303     Sodium 136 mmol/L      Potassium 3.6 mmol/L      Chloride 105 mmol/L      CO2 25 mmol/L      ANION GAP 6 mmol/L      BUN 16 mg/dL      Creatinine 0.64 mg/dL      Glucose 82 mg/dL      Calcium 9.3 mg/dL      AST 16 U/L      ALT 16 U/L      Alkaline Phosphatase 54 U/L      Total Protein 6.3 g/dL      Albumin 3.9 g/dL      Total Bilirubin 0.28 mg/dL      eGFR 110 ml/min/1.73sq m     Narrative:      National Kidney Disease Foundation guidelines for Chronic Kidney Disease (CKD):     Stage 1 with normal or high GFR (GFR > 90 mL/min/1.73 square meters)    Stage 2 Mild CKD (GFR = 60-89 mL/min/1.73 square meters)    Stage 3A Moderate CKD (GFR = 45-59 mL/min/1.73 square meters)    Stage 3B Moderate CKD (GFR = 30-44 mL/min/1.73 square meters)    Stage 4 Severe CKD (GFR = 15-29 mL/min/1.73 square meters)    Stage 5 End Stage CKD (GFR <15 mL/min/1.73 square meters)  Note: GFR calculation is accurate only with a steady state creatinine    Lipase [[id number]]  (Normal) Collected: 12/31/23 1216    Lab Status: Final result Specimen: Blood from Arm, Right Updated: 12/31/23 1303     Lipase 26 u/L     Urine Microscopic [[id number]]  (Abnormal) Collected: 12/31/23 1208    Lab Status: Final result Specimen: Urine, Clean Catch Updated: 12/31/23 1247     RBC, UA 1-2 /hpf      WBC, UA 1-2 /hpf      Epithelial Cells Occasional /hpf      Bacteria, UA Occasional /hpf      MUCUS THREADS Innumerable    CBC and differential [[id number]]  (Abnormal) Collected: 12/31/23 1216    Lab Status: Final result Specimen: Blood from Arm, Right Updated: 12/31/23 1227     WBC 7.79 Thousand/uL      RBC 4.17 Million/uL      Hemoglobin 10.8 g/dL      Hematocrit 33.8 %      MCV 81 fL      MCH 25.9 pg      MCHC 32.0 g/dL      RDW  14.6 %      MPV 12.3 fL      Platelets 149 Thousands/uL      nRBC 0 /100 WBCs      Neutrophils Relative 71 %      Immat GRANS % 0 %      Lymphocytes Relative 18 %      Monocytes Relative 8 %      Eosinophils Relative 2 %      Basophils Relative 1 %      Neutrophils Absolute 5.63 Thousands/µL      Immature Grans Absolute 0.02 Thousand/uL      Lymphocytes Absolute 1.36 Thousands/µL      Monocytes Absolute 0.62 Thousand/µL      Eosinophils Absolute 0.12 Thousand/µL      Basophils Absolute 0.04 Thousands/µL     POCT pregnancy, urine [[id number]]  (Normal) Resulted: 12/31/23 1211    Lab Status: Final result Updated: 12/31/23 1211     EXT Preg Test, Ur Negative     Control Valid    Urine Macroscopic, POC [[id number]]  (Abnormal) Collected: 12/31/23 1208    Lab Status: Final result Specimen: Urine Updated: 12/31/23 1210     Color, UA Orange     Clarity, UA Clear     pH, UA 7.0     Leukocytes, UA Negative     Nitrite, UA Positive     Protein, UA 30 (1+) mg/dl      Glucose,  (1/10%) mg/dl      Ketones, UA Trace mg/dl      Urobilinogen, UA 1.0 E.U./dl      Bilirubin, UA Negative     Occult Blood, UA Negative     Specific Gravity, UA 1.020    Narrative:      CLINITEK RESULT                   CT abdomen pelvis with contrast   ED Interpretation by Marquise Dewitt MD (12/31 8199)   I have reviewed the film, per my independent interpretation : no free air or obstruction.        Final Result by Karina Presley MD (12/31 1326)      1. No acute findings in the abdomen or pelvis to account for the patient's symptoms. No obstructive uropathy.      2. Enlarged heterogeneous uterus with 2.6 cm anterior exophytic fundal fibroid.                     Workstation performed: [id number]                    Procedures  Procedures         ED Course                               SBIRT 20yo+      Flowsheet Row Most Recent Value   Initial Alcohol Screen: US AUDIT-C     1. How often do you have a drink containing alcohol? 0 Filed at:  12/31/2023 1132   2. How many drinks containing alcohol do you have on a typical day you are drinking?  0 Filed at: 12/31/2023 1132   3a. Male UNDER 65: How often do you have five or more drinks on one occasion? 0 Filed at: 12/31/2023 1132   3b. FEMALE Any Age, or MALE 65+: How often do you have 4 or more drinks on one occassion? 0 Filed at: 12/31/2023 1132   Audit-C Score 0 Filed at: 12/31/2023 1132   MARIA C: How many times in the past year have you...    Used an illegal drug or used a prescription medication for non-medical reasons? Never Filed at: 12/31/2023 1132                      Medical Decision Making  Patient does not have a fever and the white count is not elevated she does not appear toxic.  Her nitrites are positive in the urine and given the fact that she has some dysuria will have her continue the antibiotics for now.  She seems to be describing renal colic.  There is no kidney stone there is no renal infarction and there is no evidence of Adis on the CT.  She does not have a fever no white count is not tachycardic.  She felt much better after the pain medication so we can continue that and she was hydrated here.  Will have her follow-up with her primary care doctor.  She was told about the fibroid on the CT scan which I do not think is causing any of this discomfort as it is relatively small but she can follow-up with her OB/GYN doctor for that.    Amount and/or Complexity of Data Reviewed  Labs: ordered.  Radiology: ordered.    Risk  Prescription drug management.             Disposition  Final diagnoses:   UTI (urinary tract infection)   Renal colic on left side   Nausea   Fibroid     Time reflects when diagnosis was documented in both MDM as applicable and the Disposition within this note       Time User Action Codes Description Comment    12/31/2023  2:50 PM Marquise Dewitt Add [N39.0] UTI (urinary tract infection)     12/31/2023  2:50 PM Marquise Dewitt Add [N23] Renal colic on left  side     12/31/2023  2:50 PM Marquise Dewitt Add [R11.0] Nausea     12/31/2023  2:53 PM Marquise Dewitt Add [D21.9] Fibroid           ED Disposition       ED Disposition   Discharge    Condition   Stable    Date/Time   Sun Dec 31, 2023 1450    Comment   Fiorella JOSELIN Stroud De Chavez discharge to home/self care.                   Follow-up Information       Follow up With Specialties Details Why Contact Info    Your Primary Care Doctor  Schedule an appointment as soon as possible for a visit in 1 week reevaluation Shante Diggs CRNP   [address] [place] [id number] [phone] (Work)    Camelia León PA-C Physician Assistant Schedule an appointment as soon as possible for a visit in 1 week reevaluation for fibroid or pelvic pain or bleeding [address] [address]  [phone]              Discharge Medication List as of 12/31/2023  2:55 PM        START taking these medications    Details   ibuprofen (MOTRIN) 600 mg tablet Take 1 tablet (600 mg total) by mouth every 8 (eight) hours as needed for mild pain, moderate pain or fever for up to 15 doses, Starting Sun 12/31/2023, Normal      ondansetron (ZOFRAN-ODT) 4 mg disintegrating tablet Take 1 tablet (4 mg total) by mouth every 8 (eight) hours as needed for nausea or vomiting for up to 10 doses, Starting Sun 12/31/2023, Normal             No discharge procedures on file.    PDMP Review       None            ED Provider  Electronically Signed by             Marquise Dewitt MD  12/31/23 9312

## 2024-01-12 ENCOUNTER — TELEPHONE (OUTPATIENT)
Age: 44
End: 2024-01-12

## 2024-01-15 ENCOUNTER — OFFICE VISIT (OUTPATIENT)
Dept: PAIN MEDICINE | Facility: MEDICAL CENTER | Age: 44
End: 2024-01-15
Payer: COMMERCIAL

## 2024-01-15 VITALS — HEIGHT: 67 IN | BODY MASS INDEX: 23.86 KG/M2 | WEIGHT: 152 LBS

## 2024-01-15 DIAGNOSIS — M54.6 THORACIC SPINE PAIN: ICD-10-CM

## 2024-01-15 DIAGNOSIS — M54.50 LOW BACK PAIN, UNSPECIFIED BACK PAIN LATERALITY, UNSPECIFIED CHRONICITY, UNSPECIFIED WHETHER SCIATICA PRESENT: ICD-10-CM

## 2024-01-15 DIAGNOSIS — M79.18 MYOFASCIAL PAIN SYNDROME: Primary | ICD-10-CM

## 2024-01-15 PROCEDURE — 99214 OFFICE O/P EST MOD 30 MIN: CPT | Performed by: ANESTHESIOLOGY

## 2024-01-15 RX ORDER — CYCLOBENZAPRINE HCL 10 MG
10 TABLET ORAL 2 TIMES DAILY PRN
Qty: 30 TABLET | Refills: 0 | Status: SHIPPED | OUTPATIENT
Start: 2024-01-15 | End: 2024-01-30

## 2024-01-15 NOTE — PROGRESS NOTES
Assessment:  1. Myofascial pain syndrome    2. Thoracic spine pain    3. Low back pain, unspecified back pain laterality, unspecified chronicity, unspecified whether sciatica present        Plan:    Patient presenting for office follow up visit. Patient has a history of chronic neck thoracic, lumbar back pain for greater than 1 year, worsening over the past several months.    During today's evaluation patient is demonstrating pain that is multifactorial in nature, with the main pain generator likely stemming from myofascial pain syndrome. Symptoms are accompanied by pain >7/10 on the pain scale with inability to participate in IADLs for >6 weeks. Patient has participated with PT. Patient defers additional PT at this time due to time and financial constraints.    Has been taking Tylenol, ibuprofen, Flexeril with modest benefit.  Denies any gait instability, saddle anesthesia.      Reviewed and interpreted pertinent imaging studies - specifically cervical and lumbar MRI studies and discussed the results and clinical significance with the patient. Patient's lumbar MRI shows an annular fissure at L4-5 and mild bulge at L5-S1 without central or foraminal compromise. Cervical MRI shows mild protrusion at C3-4 with mass effect on thecal sac and bulging annulus at C4-5 without central or foraminal compromise.     Previously the ultrasound guided trigger point injections on 6/7/23 provided significant relief (at least greater than 50% improvement lasting several months).    Will schedule for repeat ultrasound guided TPIs.  Will Rx Flexeril 10mg BID PRN for muscle spasms.     Reviewed external notes from the relevant aspects of the patient's medical record, specifically primary care physician office notes in regards to current and prior treatments tried (as mentioned in history of present illness).     Reviewed pertinent laboratory studies, specifically renal function, hemoglobin A1c, CBC, prior to initiating the recommended  medication therapies/interventional treatment options.    [place] Prescription Drug Monitoring Program report was reviewed and was appropriate     My impressions and treatment recommendations were discussed in detail with the patient who verbalized understanding and had no further questions.  Discharge instructions were provided. I personally saw and examined the patient and I agree with the above discussed plan of care.    Orders Placed This Encounter   Procedures    US msk guidance     Thoracic and lumbar paraspinal TPIs (USGI for 15 min)     Standing Status:   Future     Standing Expiration Date:   1/15/2028     Scheduling Instructions:      No prep required.        Please bring your insurance cards and a form of photo ID.  Bring your order/script for the test if from a non - [facility] provider.        Arrive 15 minutes prior to your appointment time to register.            To schedule this appointment, please contact Central Scheduling at [phone].           New Medications Ordered This Visit   Medications    cyclobenzaprine (FLEXERIL) 10 mg tablet     Sig: Take 1 tablet (10 mg total) by mouth 2 (two) times a day as needed for muscle spasms for up to 15 days     Dispense:  30 tablet     Refill:  0       History of Present Illness:  Angela Vides is a 44 y.o. female who presents for a follow up office visit in regards to back pain. The patient’s current symptoms include worsening back pain symptoms rated 9/10 and described as a constant dull/aching, pressure-like, dull/aching pain worse in the morning and at night.    I have personally reviewed and/or updated the patient's past medical history, past surgical history, family history, social history, current medications, allergies, and vital signs today.     Review of Systems   Constitutional:  Negative for chills and fever.   HENT:  Negative for ear pain and sore throat.    Eyes:  Negative for pain and visual disturbance.   Respiratory:   Negative for cough and shortness of breath.    Cardiovascular:  Negative for chest pain and palpitations.   Gastrointestinal:  Negative for abdominal pain and vomiting.   Genitourinary:  Negative for dysuria and hematuria.   Musculoskeletal:  Positive for back pain, gait problem and myalgias. Negative for arthralgias.   Skin:  Negative for color change and rash.   Neurological:  Positive for dizziness. Negative for seizures and syncope.   Psychiatric/Behavioral:  Positive for decreased concentration.    All other systems reviewed and are negative.      There is no problem list on file for this patient.      History reviewed. No pertinent past medical history.    History reviewed. No pertinent surgical history.    History reviewed. No pertinent family history.    Social History     Occupational History    Not on file   Tobacco Use    Smoking status: Never    Smokeless tobacco: Never   Vaping Use    Vaping status: Never Used   Substance and Sexual Activity    Alcohol use: Never    Drug use: Never    Sexual activity: Not Currently       Current Outpatient Medications on File Prior to Visit   Medication Sig    acetaminophen (TYLENOL) 500 mg tablet Take 500 mg by mouth every 6 (six) hours as needed    hydrOXYzine HCL (ATARAX) 10 mg tablet Take 10 mg by mouth    ibuprofen (MOTRIN) 600 mg tablet Take 1 tablet (600 mg total) by mouth every 8 (eight) hours as needed for mild pain, moderate pain or fever for up to 15 doses    methylPREDNISolone 4 MG tablet therapy pack Use as directed on package    ondansetron (ZOFRAN-ODT) 4 mg disintegrating tablet Take 1 tablet (4 mg total) by mouth every 8 (eight) hours as needed for nausea or vomiting for up to 10 doses    phenazopyridine (PYRIDIUM) 200 mg tablet Take 1 tablet (200 mg total) by mouth 3 (three) times a day    traMADol (ULTRAM) 50 mg tablet Take 50 mg by mouth every 6 (six) hours as needed    [DISCONTINUED] cyclobenzaprine (FLEXERIL) 10 mg tablet Take 1 tablet (10 mg total)  "by mouth 3 (three) times a day as needed for muscle spasms     No current facility-administered medications on file prior to visit.       No Known Allergies    Physical Exam:    Ht 5' 6.5\" (1.689 m)   Wt 68.9 kg (152 lb)   BMI 24.17 kg/m²     Constitutional:normal, well developed, well nourished, alert, in no distress and non-toxic and no overt pain behavior.  Eyes:anicteric  HEENT:grossly intact  Neck:supple, symmetric, trachea midline and no masses   Pulmonary:even and unlabored  Cardiovascular:No edema or pitting edema present  Skin:Normal without rashes or lesions and well hydrated  Psychiatric:Mood and affect appropriate  Neurologic: Motor function is grossly intact with no focal neurologic deficits   Musculoskeletal: Tenderness in the left greater than right thoracic parapinal muscles, rhomboids, erector spinae, multifidus consistent with trigger points.    Imaging    "

## 2024-01-16 ENCOUNTER — PROCEDURE VISIT (OUTPATIENT)
Dept: PAIN MEDICINE | Facility: CLINIC | Age: 44
End: 2024-01-16
Payer: COMMERCIAL

## 2024-01-16 DIAGNOSIS — M79.18 MYOFASCIAL PAIN SYNDROME: Primary | ICD-10-CM

## 2024-01-16 PROCEDURE — 20553 NJX 1/MLT TRIGGER POINTS 3/>: CPT | Performed by: ANESTHESIOLOGY

## 2024-01-16 PROCEDURE — 76942 ECHO GUIDE FOR BIOPSY: CPT | Performed by: ANESTHESIOLOGY

## 2024-01-16 RX ORDER — TRIAMCINOLONE ACETONIDE 40 MG/ML
40 INJECTION, SUSPENSION INTRA-ARTICULAR; INTRAMUSCULAR ONCE
Status: COMPLETED | OUTPATIENT
Start: 2024-01-16 | End: 2024-01-16

## 2024-01-16 RX ORDER — BUPIVACAINE HYDROCHLORIDE 2.5 MG/ML
6 INJECTION, SOLUTION EPIDURAL; INFILTRATION; INTRACAUDAL ONCE
Status: COMPLETED | OUTPATIENT
Start: 2024-01-16 | End: 2024-01-16

## 2024-01-16 RX ADMIN — BUPIVACAINE HYDROCHLORIDE 6 ML: 2.5 INJECTION, SOLUTION EPIDURAL; INFILTRATION; INTRACAUDAL at 10:35

## 2024-01-16 RX ADMIN — TRIAMCINOLONE ACETONIDE 40 MG: 40 INJECTION, SUSPENSION INTRA-ARTICULAR; INTRAMUSCULAR at 10:37

## 2024-01-16 NOTE — PROGRESS NOTES
Procedure Note  Indication: back pain  Preoperative diagnosis: Myofascial pain  Postoperative diagnosis: Same     Procedure: Ultrasound guided trigger point injections     Muscles injected: right thoracic paraspinals (erector spinae, spinalis thoracis), left rhomboid- 3 muscles,4 trigger points     Medications: 7mL used of 10mL mixture containing 40mg Kenalog with 9mL 0.25% bupivacaine      After discussing the risks, benefits, and alternatives to the procedure, the patient expressed understanding and wished to proceed. The patient was placed in sitting position. A procedural pause was conducted to verify: Correct patient identity, procedure to be performed and as applicable, correct side and site, correct patient position, and availability of implants, special equipment or special requirements.     Following this, the area was prepped with Chloraprep. A 2 inch 25 gauge needle was advanced into the identified trigger points with live ultrasound guidance using a 12MHz linear probe. After negative aspiration of blood, air, or bodily fluids; 2cc of the above injectate was injected into each trigger point.     The patient tolerated the procedure well and there were no apparent complications. After an appropriate amount of observation, the patient was dismissed from thein good condition under their own power.

## 2024-03-03 ENCOUNTER — APPOINTMENT (EMERGENCY)
Dept: CT IMAGING | Facility: HOSPITAL | Age: 44
End: 2024-03-03
Payer: COMMERCIAL

## 2024-03-03 ENCOUNTER — HOSPITAL ENCOUNTER (EMERGENCY)
Facility: HOSPITAL | Age: 44
Discharge: HOME | End: 2024-03-03
Attending: EMERGENCY MEDICINE
Payer: COMMERCIAL

## 2024-03-03 VITALS
RESPIRATION RATE: 16 BRPM | SYSTOLIC BLOOD PRESSURE: 114 MMHG | BODY MASS INDEX: 22.96 KG/M2 | HEART RATE: 58 BPM | TEMPERATURE: 97.4 F | OXYGEN SATURATION: 100 % | DIASTOLIC BLOOD PRESSURE: 67 MMHG | WEIGHT: 144.4 LBS

## 2024-03-03 DIAGNOSIS — R11.0 NAUSEA: ICD-10-CM

## 2024-03-03 DIAGNOSIS — E86.0 DEHYDRATION: ICD-10-CM

## 2024-03-03 DIAGNOSIS — Z98.84 HX OF BARIATRIC SURGERY: ICD-10-CM

## 2024-03-03 DIAGNOSIS — R10.9 ACUTE ABDOMINAL PAIN: Primary | ICD-10-CM

## 2024-03-03 LAB
ALBUMIN SERPL BCP-MCNC: 3.8 G/DL (ref 3.5–5)
ALP SERPL-CCNC: 40 U/L (ref 34–104)
ALT SERPL W P-5'-P-CCNC: 13 U/L (ref 7–52)
ANION GAP SERPL CALCULATED.3IONS-SCNC: 2 MMOL/L
AST SERPL W P-5'-P-CCNC: 15 U/L (ref 13–39)
BASOPHILS # BLD AUTO: 0.08 THOUSANDS/ÂΜL (ref 0–0.1)
BASOPHILS NFR BLD AUTO: 2 % (ref 0–1)
BILIRUB SERPL-MCNC: 0.41 MG/DL (ref 0.2–1)
BILIRUB UR QL STRIP: NEGATIVE
BUN SERPL-MCNC: 14 MG/DL (ref 5–25)
CALCIUM SERPL-MCNC: 8.8 MG/DL (ref 8.4–10.2)
CHLORIDE SERPL-SCNC: 105 MMOL/L (ref 96–108)
CLARITY UR: CLEAR
CO2 SERPL-SCNC: 30 MMOL/L (ref 21–32)
COLOR UR: YELLOW
CREAT SERPL-MCNC: 0.7 MG/DL (ref 0.6–1.3)
EOSINOPHIL # BLD AUTO: 0.16 THOUSAND/ÂΜL (ref 0–0.61)
EOSINOPHIL NFR BLD AUTO: 4 % (ref 0–6)
ERYTHROCYTE [DISTWIDTH] IN BLOOD BY AUTOMATED COUNT: 15 % (ref 11.6–15.1)
EXT PREGNANCY TEST URINE: NEGATIVE
EXT. CONTROL: NORMAL
GFR SERPL CREATININE-BSD FRML MDRD: 106 ML/MIN/1.73SQ M
GLUCOSE SERPL-MCNC: 89 MG/DL (ref 65–140)
GLUCOSE UR STRIP-MCNC: ABNORMAL MG/DL
HCT VFR BLD AUTO: 33.3 % (ref 34.8–46.1)
HGB BLD-MCNC: 10.5 G/DL (ref 11.5–15.4)
HGB UR QL STRIP.AUTO: NEGATIVE
IMM GRANULOCYTES # BLD AUTO: 0.02 THOUSAND/UL (ref 0–0.2)
IMM GRANULOCYTES NFR BLD AUTO: 0 % (ref 0–2)
KETONES UR STRIP-MCNC: NEGATIVE MG/DL
LEUKOCYTE ESTERASE UR QL STRIP: NEGATIVE
LIPASE SERPL-CCNC: 22 U/L (ref 11–82)
LYMPHOCYTES # BLD AUTO: 1.67 THOUSANDS/ÂΜL (ref 0.6–4.47)
LYMPHOCYTES NFR BLD AUTO: 37 % (ref 14–44)
MCH RBC QN AUTO: 25.1 PG (ref 26.8–34.3)
MCHC RBC AUTO-ENTMCNC: 31.5 G/DL (ref 31.4–37.4)
MCV RBC AUTO: 80 FL (ref 82–98)
MONOCYTES # BLD AUTO: 0.3 THOUSAND/ÂΜL (ref 0.17–1.22)
MONOCYTES NFR BLD AUTO: 7 % (ref 4–12)
NEUTROPHILS # BLD AUTO: 2.33 THOUSANDS/ÂΜL (ref 1.85–7.62)
NEUTS SEG NFR BLD AUTO: 50 % (ref 43–75)
NITRITE UR QL STRIP: NEGATIVE
NRBC BLD AUTO-RTO: 0 /100 WBCS
PH UR STRIP.AUTO: 7.5 [PH] (ref 4.5–8)
PLATELET # BLD AUTO: 206 THOUSANDS/UL (ref 149–390)
PMV BLD AUTO: 12 FL (ref 8.9–12.7)
POTASSIUM SERPL-SCNC: 4.1 MMOL/L (ref 3.5–5.3)
PROT SERPL-MCNC: 6.2 G/DL (ref 6.4–8.4)
PROT UR STRIP-MCNC: NEGATIVE MG/DL
RBC # BLD AUTO: 4.19 MILLION/UL (ref 3.81–5.12)
SODIUM SERPL-SCNC: 137 MMOL/L (ref 135–147)
SP GR UR STRIP.AUTO: 1.02 (ref 1–1.03)
UROBILINOGEN UR QL STRIP.AUTO: 0.2 E.U./DL
WBC # BLD AUTO: 4.56 THOUSAND/UL (ref 4.31–10.16)

## 2024-03-03 PROCEDURE — 74177 CT ABD & PELVIS W/CONTRAST: CPT

## 2024-03-03 PROCEDURE — 99284 EMERGENCY DEPT VISIT MOD MDM: CPT

## 2024-03-03 PROCEDURE — 81003 URINALYSIS AUTO W/O SCOPE: CPT

## 2024-03-03 PROCEDURE — 83690 ASSAY OF LIPASE: CPT | Performed by: EMERGENCY MEDICINE

## 2024-03-03 PROCEDURE — 96361 HYDRATE IV INFUSION ADD-ON: CPT

## 2024-03-03 PROCEDURE — 96375 TX/PRO/DX INJ NEW DRUG ADDON: CPT

## 2024-03-03 PROCEDURE — 36415 COLL VENOUS BLD VENIPUNCTURE: CPT | Performed by: EMERGENCY MEDICINE

## 2024-03-03 PROCEDURE — 80053 COMPREHEN METABOLIC PANEL: CPT | Performed by: EMERGENCY MEDICINE

## 2024-03-03 PROCEDURE — 96374 THER/PROPH/DIAG INJ IV PUSH: CPT

## 2024-03-03 PROCEDURE — 85025 COMPLETE CBC W/AUTO DIFF WBC: CPT | Performed by: EMERGENCY MEDICINE

## 2024-03-03 PROCEDURE — 81025 URINE PREGNANCY TEST: CPT | Performed by: EMERGENCY MEDICINE

## 2024-03-03 RX ORDER — ONDANSETRON 2 MG/ML
4 INJECTION INTRAMUSCULAR; INTRAVENOUS ONCE
Status: COMPLETED | OUTPATIENT
Start: 2024-03-03 | End: 2024-03-03

## 2024-03-03 RX ORDER — ONDANSETRON 4 MG/1
4 TABLET, FILM COATED ORAL EVERY 8 HOURS PRN
Qty: 7 TABLET | Refills: 0 | Status: SHIPPED | OUTPATIENT
Start: 2024-03-03

## 2024-03-03 RX ORDER — FAMOTIDINE 20 MG/1
20 TABLET, FILM COATED ORAL 2 TIMES DAILY
Qty: 28 TABLET | Refills: 0 | Status: SHIPPED | OUTPATIENT
Start: 2024-03-03 | End: 2024-03-17

## 2024-03-03 RX ORDER — FENTANYL CITRATE 50 UG/ML
50 INJECTION, SOLUTION INTRAMUSCULAR; INTRAVENOUS ONCE
Status: COMPLETED | OUTPATIENT
Start: 2024-03-03 | End: 2024-03-03

## 2024-03-03 RX ORDER — SUCRALFATE ORAL 1 G/10ML
1 SUSPENSION ORAL 4 TIMES DAILY
Qty: 420 ML | Refills: 0 | Status: SHIPPED | OUTPATIENT
Start: 2024-03-03 | End: 2024-03-10

## 2024-03-03 RX ADMIN — IOHEXOL 50 ML: 240 INJECTION, SOLUTION INTRATHECAL; INTRAVASCULAR; INTRAVENOUS; ORAL at 09:09

## 2024-03-03 RX ADMIN — IOHEXOL 100 ML: 350 INJECTION, SOLUTION INTRAVENOUS at 09:09

## 2024-03-03 RX ADMIN — FENTANYL CITRATE 50 MCG: 50 INJECTION INTRAMUSCULAR; INTRAVENOUS at 08:07

## 2024-03-03 RX ADMIN — ONDANSETRON 4 MG: 2 INJECTION INTRAMUSCULAR; INTRAVENOUS at 08:02

## 2024-03-03 RX ADMIN — SODIUM CHLORIDE 1000 ML: 0.9 INJECTION, SOLUTION INTRAVENOUS at 08:01

## 2024-03-03 NOTE — ED PROVIDER NOTES
History  Chief Complaint   Patient presents with    Abdominal Pain     C/o burning abdominal pain x2 days.  Taking antacids without relief, but pain continues.  Also c/o HA. Nausea without vomiting and a lot of gas in stomach.  Denies sick contacts.         History provided by:  Patient  Abdominal Pain  Pain location:  Epigastric, LUQ and RUQ  Pain quality: burning    Pain radiates to:  Does not radiate  Pain severity:  Moderate  Onset quality:  Gradual  Duration: several days.  Timing:  Constant  Progression:  Worsening  Chronicity:  New  Context comment:  Hx bariatric surgery  Relieved by:  Nothing  Worsened by:  Nothing  Ineffective treatments:  Antacids  Associated symptoms: nausea    Associated symptoms: no anorexia, no belching, no chest pain, no constipation, no cough, no diarrhea, no dysuria, no fatigue, no fever, no hematemesis, no hematochezia, no hematuria, no melena, no shortness of breath, no sore throat and no vomiting        Prior to Admission Medications   Prescriptions Last Dose Informant Patient Reported? Taking?   acetaminophen (TYLENOL) 500 mg tablet  Self Yes No   Sig: Take 500 mg by mouth every 6 (six) hours as needed   cyclobenzaprine (FLEXERIL) 10 mg tablet   No No   Sig: Take 1 tablet (10 mg total) by mouth 2 (two) times a day as needed for muscle spasms for up to 15 days   hydrOXYzine HCL (ATARAX) 10 mg tablet  Self Yes No   Sig: Take 10 mg by mouth   ibuprofen (MOTRIN) 600 mg tablet   No No   Sig: Take 1 tablet (600 mg total) by mouth every 8 (eight) hours as needed for mild pain, moderate pain or fever for up to 15 doses   methylPREDNISolone 4 MG tablet therapy pack  Self No No   Sig: Use as directed on package   ondansetron (ZOFRAN-ODT) 4 mg disintegrating tablet   No No   Sig: Take 1 tablet (4 mg total) by mouth every 8 (eight) hours as needed for nausea or vomiting for up to 10 doses   phenazopyridine (PYRIDIUM) 200 mg tablet   No No   Sig: Take 1 tablet (200 mg total) by mouth 3  (three) times a day   traMADol (ULTRAM) 50 mg tablet   Yes No   Sig: Take 50 mg by mouth every 6 (six) hours as needed      Facility-Administered Medications: None       History reviewed. No pertinent past medical history.    History reviewed. No pertinent surgical history.    History reviewed. No pertinent family history.  I have reviewed and agree with the history as documented.    E-Cigarette/Vaping    E-Cigarette Use Never User      E-Cigarette/Vaping Substances     Social History     Tobacco Use    Smoking status: Never    Smokeless tobacco: Never   Vaping Use    Vaping status: Never Used   Substance Use Topics    Alcohol use: Never    Drug use: Never       Review of Systems   Constitutional:  Negative for activity change, appetite change, fatigue and fever.   HENT:  Negative for congestion, dental problem, ear pain, rhinorrhea and sore throat.    Eyes:  Negative for pain and redness.   Respiratory:  Negative for cough, chest tightness, shortness of breath and wheezing.    Cardiovascular:  Negative for chest pain and palpitations.   Gastrointestinal:  Positive for abdominal pain and nausea. Negative for anorexia, blood in stool, constipation, diarrhea, hematemesis, hematochezia, melena and vomiting.   Endocrine: Negative for cold intolerance and heat intolerance.   Genitourinary:  Negative for dysuria, frequency and hematuria.   Musculoskeletal:  Negative for arthralgias and myalgias.   Skin:  Negative for color change, pallor and rash.   Neurological:  Negative for weakness and numbness.   Hematological:  Does not bruise/bleed easily.   Psychiatric/Behavioral:  Negative for agitation, hallucinations and suicidal ideas.        Physical Exam  Physical Exam  Vitals and nursing note reviewed.   Constitutional:       Appearance: She is well-developed.   HENT:      Mouth/Throat:      Pharynx: No oropharyngeal exudate.   Eyes:      Conjunctiva/sclera: Conjunctivae normal.   Neck:      Comments: No meningeal  signs  Cardiovascular:      Rate and Rhythm: Normal rate and regular rhythm.      Heart sounds: Normal heart sounds.   Pulmonary:      Effort: Pulmonary effort is normal. No respiratory distress.      Breath sounds: Normal breath sounds. No wheezing or rales.   Chest:      Chest wall: No tenderness.   Abdominal:      General: Bowel sounds are normal. There is no distension.      Palpations: Abdomen is soft. There is no mass.      Tenderness: There is generalized abdominal tenderness. There is no right CVA tenderness, left CVA tenderness, guarding or rebound.      Hernia: No hernia is present.      Comments: No cvat   Musculoskeletal:         General: Normal range of motion.      Cervical back: Normal range of motion and neck supple.   Lymphadenopathy:      Cervical: No cervical adenopathy.   Skin:     Findings: No erythema or rash.      Comments: No edema   Neurological:      Mental Status: She is alert and oriented to person, place, and time.      Cranial Nerves: No cranial nerve deficit.   Psychiatric:         Behavior: Behavior normal.         Vital Signs  ED Triage Vitals   Temperature Pulse Respirations Blood Pressure SpO2   03/03/24 0732 03/03/24 0734 03/03/24 0734 03/03/24 0734 03/03/24 0734   (!) 97.4 °F (36.3 °C) 85 16 115/56 99 %      Temp Source Heart Rate Source Patient Position - Orthostatic VS BP Location FiO2 (%)   03/03/24 0732 03/03/24 0830 03/03/24 0830 03/03/24 0830 --   Oral Monitor Lying Right arm       Pain Score       03/03/24 0734       9           Vitals:    03/03/24 0734 03/03/24 0830   BP: 115/56 114/67   Pulse: 85 58   Patient Position - Orthostatic VS:  Lying         Visual Acuity      ED Medications  Medications   ondansetron (ZOFRAN) injection 4 mg (4 mg Intravenous Given 3/3/24 0802)   sodium chloride 0.9 % bolus 1,000 mL (0 mL Intravenous Stopped 3/3/24 0909)   fentanyl citrate (PF) 100 MCG/2ML 50 mcg (50 mcg Intravenous Given 3/3/24 0807)   iohexol (OMNIPAQUE) 350 MG/ML injection  (MULTI-DOSE) 100 mL (100 mL Intravenous Given 3/3/24 0909)   iohexol (OMNIPAQUE) 240 MG/ML solution 50 mL (50 mL Oral Given 3/3/24 0909)       Diagnostic Studies  Results Reviewed       Procedure Component Value Units Date/Time    Comprehensive metabolic panel [[id number]]  (Abnormal) Collected: 03/03/24 0803    Lab Status: Final result Specimen: Blood from Arm, Left Updated: 03/03/24 0840     Sodium 137 mmol/L      Potassium 4.1 mmol/L      Chloride 105 mmol/L      CO2 30 mmol/L      ANION GAP 2 mmol/L      BUN 14 mg/dL      Creatinine 0.70 mg/dL      Glucose 89 mg/dL      Calcium 8.8 mg/dL      AST 15 U/L      ALT 13 U/L      Alkaline Phosphatase 40 U/L      Total Protein 6.2 g/dL      Albumin 3.8 g/dL      Total Bilirubin 0.41 mg/dL      eGFR 106 ml/min/1.73sq m     Narrative:      National Kidney Disease Foundation guidelines for Chronic Kidney Disease (CKD):     Stage 1 with normal or high GFR (GFR > 90 mL/min/1.73 square meters)    Stage 2 Mild CKD (GFR = 60-89 mL/min/1.73 square meters)    Stage 3A Moderate CKD (GFR = 45-59 mL/min/1.73 square meters)    Stage 3B Moderate CKD (GFR = 30-44 mL/min/1.73 square meters)    Stage 4 Severe CKD (GFR = 15-29 mL/min/1.73 square meters)    Stage 5 End Stage CKD (GFR <15 mL/min/1.73 square meters)  Note: GFR calculation is accurate only with a steady state creatinine    Lipase [[id number]]  (Normal) Collected: 03/03/24 0803    Lab Status: Final result Specimen: Blood from Arm, Left Updated: 03/03/24 0840     Lipase 22 u/L     CBC and differential [[id number]]  (Abnormal) Collected: 03/03/24 0803    Lab Status: Final result Specimen: Blood from Arm, Left Updated: 03/03/24 0809     WBC 4.56 Thousand/uL      RBC 4.19 Million/uL      Hemoglobin 10.5 g/dL      Hematocrit 33.3 %      MCV 80 fL      MCH 25.1 pg      MCHC 31.5 g/dL      RDW 15.0 %      MPV 12.0 fL      Platelets 206 Thousands/uL      nRBC 0 /100 WBCs      Neutrophils Relative 50 %      Immat GRANS % 0 %       Lymphocytes Relative 37 %      Monocytes Relative 7 %      Eosinophils Relative 4 %      Basophils Relative 2 %      Neutrophils Absolute 2.33 Thousands/µL      Immature Grans Absolute 0.02 Thousand/uL      Lymphocytes Absolute 1.67 Thousands/µL      Monocytes Absolute 0.30 Thousand/µL      Eosinophils Absolute 0.16 Thousand/µL      Basophils Absolute 0.08 Thousands/µL     POCT pregnancy, urine [[id number]]  (Normal) Resulted: 03/03/24 0802    Lab Status: Final result Updated: 03/03/24 0803     EXT Preg Test, Ur Negative     Control Valid    Urine Macroscopic, POC [[id number]]  (Abnormal) Collected: 03/03/24 0800    Lab Status: Final result Specimen: Urine Updated: 03/03/24 0801     Color, UA Yellow     Clarity, UA Clear     pH, UA 7.5     Leukocytes, UA Negative     Nitrite, UA Negative     Protein, UA Negative mg/dl      Glucose,  (1/10%) mg/dl      Ketones, UA Negative mg/dl      Urobilinogen, UA 0.2 E.U./dl      Bilirubin, UA Negative     Occult Blood, UA Negative     Specific Gravity, UA 1.020    Narrative:      CLINITEK RESULT                   CT abdomen pelvis with contrast   Final Result by Juan David Pope MD (03/03 0931)      1.  No evidence of acute abnormality in the abdomen or pelvis.      2.  Small amount of cul-de-sac fluid, most likely physiologic.      3.  Large amount of stool throughout the colon suggesting constipation.      4.  Several uterine fibroids.         Workstation performed: [id number]                    Procedures  Procedures         ED Course  ED Course as of 03/04/24 1410   Sun Mar 03, 2024   0947 Hemoglobin(!): 10.5  stable   0947 Medical work up reviewed and benign, symptoms have improved, will reassure, , tx symptoms                               SBIRT 22yo+      Flowsheet Row Most Recent Value   Initial Alcohol Screen: US AUDIT-C     1. How often do you have a drink containing alcohol? 0 Filed at: 03/03/2024 0812   2. How many drinks containing alcohol do you  have on a typical day you are drinking?  0 Filed at: 03/03/2024 0812   3a. Male UNDER 65: How often do you have five or more drinks on one occasion? 0 Filed at: 03/03/2024 0812   3b. FEMALE Any Age, or MALE 65+: How often do you have 4 or more drinks on one occassion? 0 Filed at: 03/03/2024 0812   Audit-C Score 0 Filed at: 03/03/2024 0812   SON: How many times in the past year have you...    Used an illegal drug or used a prescription medication for non-medical reasons? Never Filed at: 03/03/2024 0812                      Medical Decision Making  Acute abdominal pain, nausea with history of bariatric surgery-will do abdominal labs for hepatitis, pancreatitis, acute kidney injury, electrolyte abnormality, urine dip, urinary tract infection, urine pregnancy to rule pregnancy complication, CT of the pelvis with acute surgical pathology such as but not limited to perforated viscus, small bowel obstruction, internal hernia, IV fluids for dehydration,.  Pain medication    Amount and/or Complexity of Data Reviewed  Labs: ordered. Decision-making details documented in ED Course.  Radiology: ordered.    Risk  Prescription drug management.             Disposition  Final diagnoses:   Acute abdominal pain   Nausea   Dehydration   Hx of bariatric surgery     Time reflects when diagnosis was documented in both MDM as applicable and the Disposition within this note       Time User Action Codes Description Comment    3/3/2024 10:10 AM Domenic Stern Add [R10.9] Acute abdominal pain     3/3/2024 10:10 AM Domenic Stern Add [R11.0] Nausea     3/3/2024 10:11 AM Domenic Stern Add [E86.0] Dehydration     3/3/2024 10:12 AM Domenic Stern Add [Z98.84] Hx of bariatric surgery           ED Disposition       ED Disposition   Discharge    Condition   Stable    Date/Time   Sun Mar 3, 2024 1010    Comment   Angela Vides discharge to home/self care.                   Follow-up Information       Follow up With Specialties Details  Why Contact Info Additional Information    [facility] Family Medicine Schedule an appointment as soon as possible for a visit in 2 days  [address] [place] [address], [address]  [facility] [address] [phone] [facility], [address] [place] [address], [address], [place], [place], [address] [phone]            Discharge Medication List as of 3/3/2024 10:12 AM        START taking these medications    Details   famotidine (PEPCID) 20 mg tablet Take 1 tablet (20 mg total) by mouth 2 (two) times a day for 28 doses, Starting Sun 3/3/2024, Until Sun 3/17/2024, Normal      ondansetron (ZOFRAN) 4 mg tablet Take 1 tablet (4 mg total) by mouth every 8 (eight) hours as needed for nausea or vomiting for up to 7 doses, Starting Sun 3/3/2024, Normal      sucralfate (CARAFATE) 1 g/10 mL suspension Take 10 mL (1 g total) by mouth 4 (four) times a day for 7 days, Starting Sun 3/3/2024, Until Sun 3/10/2024, Normal           CONTINUE these medications which have NOT CHANGED    Details   acetaminophen (TYLENOL) 500 mg tablet Take 500 mg by mouth every 6 (six) hours as needed, Historical Med      cyclobenzaprine (FLEXERIL) 10 mg tablet Take 1 tablet (10 mg total) by mouth 2 (two) times a day as needed for muscle spasms for up to 15 days, Starting Mon 1/15/2024, Until Tue 1/30/2024 at 2359, Normal      hydrOXYzine HCL (ATARAX) 10 mg tablet Take 10 mg by mouth, Starting Tue 5/16/2023, Historical Med      ibuprofen (MOTRIN) 600 mg tablet Take 1 tablet (600 mg total) by mouth every 8 (eight) hours as needed for mild pain, moderate pain or fever for up to 15 doses, Starting Sun 12/31/2023, Normal      methylPREDNISolone 4 MG tablet therapy pack Use as directed on package, Normal      ondansetron (ZOFRAN-ODT) 4 mg disintegrating tablet Take 1 tablet (4 mg total) by mouth every 8 (eight) hours as needed for nausea or vomiting for up to 10 doses, Starting Sun 12/31/2023,  Normal      phenazopyridine (PYRIDIUM) 200 mg tablet Take 1 tablet (200 mg total) by mouth 3 (three) times a day, Starting Sun 12/17/2023, Normal      traMADol (ULTRAM) 50 mg tablet Take 50 mg by mouth every 6 (six) hours as needed, Starting Fri 8/18/2023, Until Sat 8/17/2024 at 2359, Historical Med             No discharge procedures on file.    PDMP Review         Value Time User    PDMP Reviewed  Yes 1/15/2024  4:37 PM Will Sahra Banerjee MD            ED Provider  Electronically Signed by             Domenic Stern MD  03/04/24 1402

## 2024-04-25 ENCOUNTER — OFFICE VISIT (OUTPATIENT)
Dept: URGENT CARE | Age: 44
End: 2024-04-25
Payer: COMMERCIAL

## 2024-04-25 VITALS
WEIGHT: 145 LBS | SYSTOLIC BLOOD PRESSURE: 106 MMHG | RESPIRATION RATE: 18 BRPM | OXYGEN SATURATION: 99 % | HEIGHT: 66 IN | TEMPERATURE: 97.1 F | DIASTOLIC BLOOD PRESSURE: 60 MMHG | BODY MASS INDEX: 23.3 KG/M2 | HEART RATE: 78 BPM

## 2024-04-25 DIAGNOSIS — J06.9 VIRAL URI WITH COUGH: Primary | ICD-10-CM

## 2024-04-25 PROCEDURE — 99213 OFFICE O/P EST LOW 20 MIN: CPT | Performed by: EMERGENCY MEDICINE

## 2024-04-25 NOTE — LETTER
April 25, 2024     Patient: Angela Vides   YOB: 1980   Date of Visit: 4/25/2024       To Whom it May Concern:    Angela Vides was seen in my clinic on 4/25/2024. She may return to work on 4/27/24 .    If you have any questions or concerns, please don't hesitate to call.         Sincerely,          Ousmane Wood, DO        CC: No Recipients

## 2024-04-25 NOTE — PROGRESS NOTES
[facility]        NAME: Angela Vides is a 44 y.o. female  : 1980    MRN: [id number]  DATE: 2024  TIME: 12:31 PM    Assessment and Plan   Viral URI with cough [J06.9]  1. Viral URI with cough  menthol-cetylpyridinium (CEPACOL) 3 MG lozenge        Anticipated guidance given regarding continued supportive care at home to include use of Cepacol throat lozenges, over-the-counter cold and flu medications and decongestants as well as NSAIDs for pain and fever.  History and exam consistent with viral illness.  Patient is otherwise afebrile nontoxic-appearing in clinic.  Advised to follow-up closely with her PCP, and seek care in ED if symptoms worsen.  All questions answered at bedside, patient was amenable to plan and voiced understanding.    Patient Instructions       Follow up with PCP in 3-5 days.  Proceed to  ER if symptoms worsen.    If tests have been performed at [facility], our office will contact you with results if changes need to be made to the care plan discussed with you at the visit.  [facility]    Chief Complaint     Chief Complaint   Patient presents with    Flu-Like Symptoms     Patient reports 2 days ago she started to feel body aches, headaches, sneezing and sore throat. States that she has been using OTC medications with little to no results.          History of Present Illness       URI   This is a new problem. The current episode started in the past 7 days. The problem has been unchanged. Associated symptoms include congestion, coughing, rhinorrhea, sinus pain, sneezing, a sore throat and swollen glands. Pertinent negatives include no abdominal pain, chest pain, diarrhea, dysuria, ear pain, headaches, joint pain, joint swelling, nausea, neck pain, plugged ear sensation, rash, vomiting or wheezing. She has tried decongestant for the symptoms. The treatment provided no relief.       Review of Systems   Review of  Systems   Constitutional:  Positive for fatigue and fever. Negative for activity change, appetite change, chills, diaphoresis and unexpected weight change.   HENT:  Positive for congestion, ear discharge, postnasal drip, rhinorrhea, sinus pressure, sinus pain, sneezing and sore throat. Negative for dental problem, drooling, ear pain, facial swelling, hearing loss, mouth sores, nosebleeds, tinnitus, trouble swallowing and voice change.    Eyes:  Negative for pain and visual disturbance.   Respiratory:  Positive for cough. Negative for shortness of breath and wheezing.    Cardiovascular:  Negative for chest pain and palpitations.   Gastrointestinal:  Negative for abdominal distention, abdominal pain, anal bleeding, blood in stool, constipation, diarrhea, nausea, rectal pain and vomiting.   Genitourinary:  Negative for dysuria and hematuria.   Musculoskeletal:  Negative for arthralgias, back pain, joint pain and neck pain.   Skin:  Negative for color change and rash.   Neurological:  Negative for seizures, syncope and headaches.   All other systems reviewed and are negative.        Current Medications       Current Outpatient Medications:     menthol-cetylpyridinium (CEPACOL) 3 MG lozenge, Take 1 lozenge (3 mg total) by mouth as needed for sore throat, Disp: 30 lozenge, Rfl: 3    acetaminophen (TYLENOL) 500 mg tablet, Take 500 mg by mouth every 6 (six) hours as needed, Disp: , Rfl:     cyclobenzaprine (FLEXERIL) 10 mg tablet, Take 1 tablet (10 mg total) by mouth 2 (two) times a day as needed for muscle spasms for up to 15 days, Disp: 30 tablet, Rfl: 0    famotidine (PEPCID) 20 mg tablet, Take 1 tablet (20 mg total) by mouth 2 (two) times a day for 28 doses, Disp: 28 tablet, Rfl: 0    hydrOXYzine HCL (ATARAX) 10 mg tablet, Take 10 mg by mouth, Disp: , Rfl:     ibuprofen (MOTRIN) 600 mg tablet, Take 1 tablet (600 mg total) by mouth every 8 (eight) hours as needed for mild pain, moderate pain or fever for up to 15 doses,  "Disp: 15 tablet, Rfl: 0    methylPREDNISolone 4 MG tablet therapy pack, Use as directed on package, Disp: 21 tablet, Rfl: 0    ondansetron (ZOFRAN) 4 mg tablet, Take 1 tablet (4 mg total) by mouth every 8 (eight) hours as needed for nausea or vomiting for up to 7 doses, Disp: 7 tablet, Rfl: 0    ondansetron (ZOFRAN-ODT) 4 mg disintegrating tablet, Take 1 tablet (4 mg total) by mouth every 8 (eight) hours as needed for nausea or vomiting for up to 10 doses, Disp: 10 tablet, Rfl: 0    phenazopyridine (PYRIDIUM) 200 mg tablet, Take 1 tablet (200 mg total) by mouth 3 (three) times a day, Disp: 6 tablet, Rfl: 0    sucralfate (CARAFATE) 1 g/10 mL suspension, Take 10 mL (1 g total) by mouth 4 (four) times a day for 7 days, Disp: 420 mL, Rfl: 0    traMADol (ULTRAM) 50 mg tablet, Take 50 mg by mouth every 6 (six) hours as needed, Disp: , Rfl:     Current Allergies     Allergies as of 04/25/2024    (No Known Allergies)            The following portions of the patient's history were reviewed and updated as appropriate: allergies, current medications, past family history, past medical history, past social history, past surgical history and problem list.     History reviewed. No pertinent past medical history.    History reviewed. No pertinent surgical history.    History reviewed. No pertinent family history.      Medications have been verified.        Objective   /60   Pulse 78   Temp (!) 97.1 °F (36.2 °C) (Tympanic)   Resp 18   Ht 5' 6\" (1.676 m)   Wt 65.8 kg (145 lb)   SpO2 99%   BMI 23.40 kg/m²   No LMP recorded.       Physical Exam     Physical Exam  Vitals and nursing note reviewed.   Constitutional:       General: She is not in acute distress.     Appearance: Normal appearance. She is normal weight. She is not ill-appearing, toxic-appearing or diaphoretic.   HENT:      Head: Normocephalic and atraumatic.      Right Ear: Tympanic membrane, ear canal and external ear normal. There is no impacted cerumen.      " Left Ear: Tympanic membrane, ear canal and external ear normal. There is no impacted cerumen.      Nose: Congestion and rhinorrhea present.      Mouth/Throat:      Mouth: Mucous membranes are moist.      Pharynx: Posterior oropharyngeal erythema present. No oropharyngeal exudate.      Comments: No stridor, trismus, drooling noted  Eyes:      General: No scleral icterus.        Right eye: No discharge.         Left eye: No discharge.      Extraocular Movements: Extraocular movements intact.      Conjunctiva/sclera: Conjunctivae normal.      Pupils: Pupils are equal, round, and reactive to light.   Neck:      Vascular: No carotid bruit.   Cardiovascular:      Rate and Rhythm: Normal rate and regular rhythm.      Pulses: Normal pulses.      Heart sounds: Normal heart sounds.   Pulmonary:      Effort: Pulmonary effort is normal. No respiratory distress.      Breath sounds: Normal breath sounds. No stridor. No wheezing, rhonchi or rales.   Chest:      Chest wall: No tenderness.   Abdominal:      General: There is no distension.      Palpations: There is no mass.      Tenderness: There is no abdominal tenderness. There is no right CVA tenderness, left CVA tenderness, guarding or rebound.      Hernia: No hernia is present.   Musculoskeletal:      Cervical back: Normal range of motion and neck supple. No rigidity or tenderness.   Lymphadenopathy:      Cervical: Cervical adenopathy present.   Skin:     Capillary Refill: Capillary refill takes less than 2 seconds.      Coloration: Skin is not jaundiced or pale.      Findings: No bruising, erythema, lesion or rash.   Neurological:      General: No focal deficit present.      Mental Status: She is alert and oriented to person, place, and time.   Psychiatric:         Mood and Affect: Mood normal.         Behavior: Behavior normal.

## 2024-09-22 ENCOUNTER — HOSPITAL ENCOUNTER (EMERGENCY)
Facility: HOSPITAL | Age: 44
Discharge: HOME | End: 2024-09-22
Attending: EMERGENCY MEDICINE
Payer: COMMERCIAL

## 2024-09-22 VITALS
TEMPERATURE: 98.2 F | RESPIRATION RATE: 16 BRPM | BODY MASS INDEX: 24.52 KG/M2 | DIASTOLIC BLOOD PRESSURE: 59 MMHG | HEART RATE: 71 BPM | OXYGEN SATURATION: 99 % | WEIGHT: 151.9 LBS | SYSTOLIC BLOOD PRESSURE: 112 MMHG

## 2024-09-22 DIAGNOSIS — R11.0 NAUSEA: ICD-10-CM

## 2024-09-22 DIAGNOSIS — Z90.3 H/O GASTRIC SLEEVE: ICD-10-CM

## 2024-09-22 DIAGNOSIS — R10.13 EPIGASTRIC PAIN: ICD-10-CM

## 2024-09-22 DIAGNOSIS — K29.70 GASTRITIS: Primary | ICD-10-CM

## 2024-09-22 LAB
ALBUMIN SERPL BCG-MCNC: 4 G/DL (ref 3.5–5)
ALP SERPL-CCNC: 39 U/L (ref 34–104)
ALT SERPL W P-5'-P-CCNC: 16 U/L (ref 7–52)
ANION GAP SERPL CALCULATED.3IONS-SCNC: 6 MMOL/L (ref 4–13)
AST SERPL W P-5'-P-CCNC: 18 U/L (ref 13–39)
BASOPHILS # BLD AUTO: 0.07 THOUSANDS/ΜL (ref 0–0.1)
BASOPHILS NFR BLD AUTO: 1 % (ref 0–1)
BILIRUB SERPL-MCNC: 0.3 MG/DL (ref 0.2–1)
BILIRUB UR QL STRIP: NEGATIVE
BUN SERPL-MCNC: 14 MG/DL (ref 5–25)
CALCIUM SERPL-MCNC: 9 MG/DL (ref 8.4–10.2)
CHLORIDE SERPL-SCNC: 104 MMOL/L (ref 96–108)
CLARITY UR: CLEAR
CO2 SERPL-SCNC: 26 MMOL/L (ref 21–32)
COLOR UR: YELLOW
CREAT SERPL-MCNC: 0.67 MG/DL (ref 0.6–1.3)
EOSINOPHIL # BLD AUTO: 0.17 THOUSAND/ΜL (ref 0–0.61)
EOSINOPHIL NFR BLD AUTO: 3 % (ref 0–6)
ERYTHROCYTE [DISTWIDTH] IN BLOOD BY AUTOMATED COUNT: 16.8 % (ref 11.6–15.1)
EXT PREGNANCY TEST URINE: NEGATIVE
EXT. CONTROL: NORMAL
FLUAV AG UPPER RESP QL IA.RAPID: NEGATIVE
FLUBV AG UPPER RESP QL IA.RAPID: NEGATIVE
GFR SERPL CREATININE-BSD FRML MDRD: 108 ML/MIN/1.73SQ M
GLUCOSE SERPL-MCNC: 72 MG/DL (ref 65–140)
GLUCOSE UR STRIP-MCNC: NEGATIVE MG/DL
HCT VFR BLD AUTO: 33.3 % (ref 34.8–46.1)
HGB BLD-MCNC: 10.5 G/DL (ref 11.5–15.4)
HGB UR QL STRIP.AUTO: NEGATIVE
IMM GRANULOCYTES # BLD AUTO: 0.01 THOUSAND/UL (ref 0–0.2)
IMM GRANULOCYTES NFR BLD AUTO: 0 % (ref 0–2)
KETONES UR STRIP-MCNC: NEGATIVE MG/DL
LEUKOCYTE ESTERASE UR QL STRIP: NEGATIVE
LIPASE SERPL-CCNC: 29 U/L (ref 11–82)
LYMPHOCYTES # BLD AUTO: 1.87 THOUSANDS/ΜL (ref 0.6–4.47)
LYMPHOCYTES NFR BLD AUTO: 27 % (ref 14–44)
MCH RBC QN AUTO: 24.9 PG (ref 26.8–34.3)
MCHC RBC AUTO-ENTMCNC: 31.5 G/DL (ref 31.4–37.4)
MCV RBC AUTO: 79 FL (ref 82–98)
MONOCYTES # BLD AUTO: 0.46 THOUSAND/ΜL (ref 0.17–1.22)
MONOCYTES NFR BLD AUTO: 7 % (ref 4–12)
NEUTROPHILS # BLD AUTO: 4.27 THOUSANDS/ΜL (ref 1.85–7.62)
NEUTS SEG NFR BLD AUTO: 62 % (ref 43–75)
NITRITE UR QL STRIP: NEGATIVE
NRBC BLD AUTO-RTO: 0 /100 WBCS
PH UR STRIP.AUTO: 7 [PH] (ref 4.5–8)
PLATELET # BLD AUTO: 166 THOUSANDS/UL (ref 149–390)
PMV BLD AUTO: 11.8 FL (ref 8.9–12.7)
POTASSIUM SERPL-SCNC: 3.8 MMOL/L (ref 3.5–5.3)
PROT SERPL-MCNC: 6.2 G/DL (ref 6.4–8.4)
PROT UR STRIP-MCNC: NEGATIVE MG/DL
RBC # BLD AUTO: 4.22 MILLION/UL (ref 3.81–5.12)
SARS-COV+SARS-COV-2 AG RESP QL IA.RAPID: NEGATIVE
SODIUM SERPL-SCNC: 136 MMOL/L (ref 135–147)
SP GR UR STRIP.AUTO: 1.01 (ref 1–1.03)
UROBILINOGEN UR QL STRIP.AUTO: 0.2 E.U./DL
WBC # BLD AUTO: 6.85 THOUSAND/UL (ref 4.31–10.16)

## 2024-09-22 PROCEDURE — 99284 EMERGENCY DEPT VISIT MOD MDM: CPT

## 2024-09-22 PROCEDURE — 87804 INFLUENZA ASSAY W/OPTIC: CPT | Performed by: EMERGENCY MEDICINE

## 2024-09-22 PROCEDURE — 81025 URINE PREGNANCY TEST: CPT | Performed by: EMERGENCY MEDICINE

## 2024-09-22 PROCEDURE — 96375 TX/PRO/DX INJ NEW DRUG ADDON: CPT

## 2024-09-22 PROCEDURE — 83690 ASSAY OF LIPASE: CPT | Performed by: EMERGENCY MEDICINE

## 2024-09-22 PROCEDURE — 96374 THER/PROPH/DIAG INJ IV PUSH: CPT

## 2024-09-22 PROCEDURE — 81003 URINALYSIS AUTO W/O SCOPE: CPT

## 2024-09-22 PROCEDURE — 87811 SARS-COV-2 COVID19 W/OPTIC: CPT | Performed by: EMERGENCY MEDICINE

## 2024-09-22 PROCEDURE — 36415 COLL VENOUS BLD VENIPUNCTURE: CPT | Performed by: EMERGENCY MEDICINE

## 2024-09-22 PROCEDURE — 99284 EMERGENCY DEPT VISIT MOD MDM: CPT | Performed by: EMERGENCY MEDICINE

## 2024-09-22 PROCEDURE — 80053 COMPREHEN METABOLIC PANEL: CPT | Performed by: EMERGENCY MEDICINE

## 2024-09-22 PROCEDURE — 96361 HYDRATE IV INFUSION ADD-ON: CPT

## 2024-09-22 PROCEDURE — 85025 COMPLETE CBC W/AUTO DIFF WBC: CPT | Performed by: EMERGENCY MEDICINE

## 2024-09-22 RX ORDER — METOCLOPRAMIDE HYDROCHLORIDE 5 MG/ML
10 INJECTION INTRAMUSCULAR; INTRAVENOUS ONCE
Status: COMPLETED | OUTPATIENT
Start: 2024-09-22 | End: 2024-09-22

## 2024-09-22 RX ORDER — ONDANSETRON 4 MG/1
4 TABLET, FILM COATED ORAL EVERY 6 HOURS
Qty: 12 TABLET | Refills: 0 | Status: SHIPPED | OUTPATIENT
Start: 2024-09-22

## 2024-09-22 RX ORDER — FAMOTIDINE 10 MG/ML
20 INJECTION, SOLUTION INTRAVENOUS ONCE
Status: COMPLETED | OUTPATIENT
Start: 2024-09-22 | End: 2024-09-22

## 2024-09-22 RX ORDER — SUCRALFATE 1 G/1
1 TABLET ORAL ONCE
Status: COMPLETED | OUTPATIENT
Start: 2024-09-22 | End: 2024-09-22

## 2024-09-22 RX ORDER — KETOROLAC TROMETHAMINE 30 MG/ML
15 INJECTION, SOLUTION INTRAMUSCULAR; INTRAVENOUS ONCE
Status: COMPLETED | OUTPATIENT
Start: 2024-09-22 | End: 2024-09-22

## 2024-09-22 RX ORDER — SUCRALFATE 1 G/1
1 TABLET ORAL 4 TIMES DAILY
Qty: 20 TABLET | Refills: 0 | Status: SHIPPED | OUTPATIENT
Start: 2024-09-22

## 2024-09-22 RX ADMIN — KETOROLAC TROMETHAMINE 15 MG: 30 INJECTION, SOLUTION INTRAMUSCULAR; INTRAVENOUS at 16:34

## 2024-09-22 RX ADMIN — METOCLOPRAMIDE 10 MG: 5 INJECTION, SOLUTION INTRAMUSCULAR; INTRAVENOUS at 14:54

## 2024-09-22 RX ADMIN — SODIUM CHLORIDE 1000 ML: 0.9 INJECTION, SOLUTION INTRAVENOUS at 14:51

## 2024-09-22 RX ADMIN — FAMOTIDINE 20 MG: 10 INJECTION, SOLUTION INTRAVENOUS at 14:53

## 2024-09-22 RX ADMIN — SUCRALFATE 1 G: 1 TABLET ORAL at 14:50

## 2024-09-22 NOTE — ED PROVIDER NOTES
1. Gastritis    2. Epigastric pain    3. Nausea    4. H/O gastric sleeve      ED Disposition       ED Disposition   Discharge    Condition   Stable    Date/Time   Sun Sep 22, 2024  5:17 PM    Comment   Angela Vides discharge to home/self care.                   Assessment & Plan       Medical Decision Making  Abd pain, likely gastritis - Will check CBC as marker of infection/anemia, CMP to r/o hepatitis/biliary disease/electrolyte abnormalities/JENNIFER, lipase to r/o pancreatitis, urine to r/o UTI, urine preg to r/o ectopic pregnancy, send COVID/flu and treat symptoms.    Amount and/or Complexity of Data Reviewed  Labs: ordered. Decision-making details documented in ED Course.    Risk  Prescription drug management.                ED Course as of 09/22/24 1737   Sun Sep 22, 2024   1408 Temperature: 98.2 °F (36.8 °C)  afebrile   1431 PREGNANCY TEST URINE: Negative  Negative   1509 Comprehensive metabolic panel(!)  Normal   1509 LIPASE: 29  Negative for pancreatitis   1509 PREGNANCY TEST URINE: Negative  Negative   1539 FLU/COVID Rapid Antigen (30 min. TAT) - Preferred screening test in ED  Negative   1614 Hemoglobin(!): 10.5  Baseline   1614 WBC: 6.85  Normal   1617 Pt reports feeling better.  States she still has some epigastric burning, but HA and nausea are better. Updated pt on labs. Will give more meds for epigastric pain.   1716 Pt reports feeling better.  Will give symptomatic tx and GI f/u.       Medications   sodium chloride 0.9 % bolus 1,000 mL (0 mL Intravenous Stopped 9/22/24 1634)   sucralfate (CARAFATE) tablet 1 g (1 g Oral Given 9/22/24 1450)   Famotidine (PF) (PEPCID) injection 20 mg (20 mg Intravenous Given 9/22/24 1453)   metoclopramide (REGLAN) injection 10 mg (10 mg Intravenous Given 9/22/24 1454)   ketorolac (TORADOL) injection 15 mg (15 mg Intravenous Given 9/22/24 1634)       History of Present Illness       44 y.o. F w/h/o gastric sleeve (3 years ago, [place]) p/w RUQ/epigastric pain x 3  "days.  Burning pain and \"a lot of sounds.\"  States it feels like when she had gastritis.  Worse with eating.  Took pepcid without relief. Associated with nausea, right sided HA, myalgias, and subjective fever.      History provided by:  Patient   used: No    Abdominal Pain  Associated symptoms: fever (Subjective) and nausea    Associated symptoms: no chills, no cough, no diarrhea, no sore throat and no vomiting        Review of Systems   Constitutional:  Positive for fever (Subjective). Negative for chills.   HENT:  Negative for rhinorrhea and sore throat.    Eyes:  Negative for photophobia and visual disturbance.   Respiratory:  Negative for cough.    Gastrointestinal:  Positive for abdominal pain and nausea. Negative for diarrhea and vomiting.   Musculoskeletal:  Positive for myalgias.   Neurological:  Positive for headaches. Negative for weakness.           Objective     ED Triage Vitals [09/22/24 1403]   Temperature Pulse Blood Pressure Respirations SpO2 Patient Position - Orthostatic VS   98.2 °F (36.8 °C) 85 139/60 18 100 % Sitting      Temp Source Heart Rate Source BP Location FiO2 (%) Pain Score    Oral Monitor Right arm -- 6        Physical Exam  Vitals and nursing note reviewed.   Constitutional:       General: She is not in acute distress.     Appearance: Normal appearance. She is well-developed. She is not ill-appearing, toxic-appearing or diaphoretic.   HENT:      Head: Normocephalic and atraumatic.   Eyes:      General: No scleral icterus.  Neck:      Vascular: No JVD.   Cardiovascular:      Rate and Rhythm: Normal rate and regular rhythm.      Heart sounds: Normal heart sounds. No murmur heard.  Pulmonary:      Effort: Pulmonary effort is normal. No accessory muscle usage or respiratory distress.      Breath sounds: Normal breath sounds. No stridor. No wheezing, rhonchi or rales.   Abdominal:      General: There is no distension.      Palpations: Abdomen is soft. Abdomen is not " rigid. There is no mass.      Tenderness: There is abdominal tenderness in the right upper quadrant and epigastric area. There is no guarding or rebound. Negative signs include Eaton's sign and McBurney's sign.   Skin:     General: Skin is warm and dry.      Coloration: Skin is not jaundiced or pale.      Findings: No rash.   Neurological:      Mental Status: She is alert.      GCS: GCS eye subscore is 4. GCS verbal subscore is 5. GCS motor subscore is 6.         Labs Reviewed   CBC AND DIFFERENTIAL - Abnormal       Result Value    WBC 6.85      RBC 4.22      Hemoglobin 10.5 (*)     Hematocrit 33.3 (*)     MCV 79 (*)     MCH 24.9 (*)     MCHC 31.5      RDW 16.8 (*)     MPV 11.8      Platelets 166      nRBC 0      Segmented % 62      Immature Grans % 0      Lymphocytes % 27      Monocytes % 7      Eosinophils Relative 3      Basophils Relative 1      Absolute Neutrophils 4.27      Absolute Immature Grans 0.01      Absolute Lymphocytes 1.87      Absolute Monocytes 0.46      Eosinophils Absolute 0.17      Basophils Absolute 0.07     COMPREHENSIVE METABOLIC PANEL - Abnormal    Sodium 136      Potassium 3.8      Chloride 104      CO2 26      ANION GAP 6      BUN 14      Creatinine 0.67      Glucose 72      Calcium 9.0      AST 18      ALT 16      Alkaline Phosphatase 39      Total Protein 6.2 (*)     Albumin 4.0      Total Bilirubin 0.30      eGFR 108      Narrative:     National Kidney Disease Foundation guidelines for Chronic Kidney Disease (CKD):     Stage 1 with normal or high GFR (GFR > 90 mL/min/1.73 square meters)    Stage 2 Mild CKD (GFR = 60-89 mL/min/1.73 square meters)    Stage 3A Moderate CKD (GFR = 45-59 mL/min/1.73 square meters)    Stage 3B Moderate CKD (GFR = 30-44 mL/min/1.73 square meters)    Stage 4 Severe CKD (GFR = 15-29 mL/min/1.73 square meters)    Stage 5 End Stage CKD (GFR <15 mL/min/1.73 square meters)  Note: GFR calculation is accurate only with a steady state creatinine   COVID-19/INFLUENZA  A/B RAPID ANTIGEN (30 MIN.TAT) - Normal    SARS COV Rapid Antigen Negative      Influenza A Rapid Antigen Negative      Influenza B Rapid Antigen Negative      Narrative:     This test has been performed using the Browsy Francine 2 FLU+SARS Antigen test under the Emergency Use Authorization (EUA). This test has been validated by the  and verified by the performing laboratory. The Francine uses lateral flow immunofluorescent sandwich assay to detect SARS-COV, Influenza A and Influenza B Antigen.     The Quidel Francine 2 SARS Antigen test does not differentiate between SARS-CoV and SARS-CoV-2.     Negative results are presumptive and may be confirmed with a molecular assay, if necessary, for patient management. Negative results do not rule out SARS-CoV-2 or influenza infection and should not be used as the sole basis for treatment or patient management decisions. A negative test result may occur if the level of antigen in a sample is below the limit of detection of this test.     Positive results are indicative of the presence of viral antigens, but do not rule out bacterial infection or co-infection with other viruses.     All test results should be used as an adjunct to clinical observations and other information available to the provider.    FOR PEDIATRIC PATIENTS - copy/paste COVID Guidelines URL to browser: [email or web address]   LIPASE - Normal    Lipase 29     POCT PREGNANCY, URINE - Normal    EXT Preg Test, Ur Negative      Control Valid     URINE MACROSCOPIC, POC    Color, UA Yellow      Clarity, UA Clear      pH, UA 7.0      Leukocytes, UA Negative      Nitrite, UA Negative      Protein, UA Negative      Glucose, UA Negative      Ketones, UA Negative      Urobilinogen, UA 0.2      Bilirubin, UA Negative      Occult Blood, UA Negative      Specific Gravity, UA 1.015      Narrative:     CLINITEK RESULT     No orders to display       Procedures    ED  Medication and Procedure Management   Prior to Admission Medications   Prescriptions Last Dose Informant Patient Reported? Taking?   acetaminophen (TYLENOL) 500 mg tablet  Self Yes No   Sig: Take 500 mg by mouth every 6 (six) hours as needed   cyclobenzaprine (FLEXERIL) 10 mg tablet   No No   Sig: Take 1 tablet (10 mg total) by mouth 2 (two) times a day as needed for muscle spasms for up to 15 days   famotidine (PEPCID) 20 mg tablet   No No   Sig: Take 1 tablet (20 mg total) by mouth 2 (two) times a day for 28 doses   hydrOXYzine HCL (ATARAX) 10 mg tablet  Self Yes No   Sig: Take 10 mg by mouth   ibuprofen (MOTRIN) 600 mg tablet   No No   Sig: Take 1 tablet (600 mg total) by mouth every 8 (eight) hours as needed for mild pain, moderate pain or fever for up to 15 doses   methylPREDNISolone 4 MG tablet therapy pack  Self No No   Sig: Use as directed on package   ondansetron (ZOFRAN) 4 mg tablet   No No   Sig: Take 1 tablet (4 mg total) by mouth every 8 (eight) hours as needed for nausea or vomiting for up to 7 doses   ondansetron (ZOFRAN-ODT) 4 mg disintegrating tablet   No No   Sig: Take 1 tablet (4 mg total) by mouth every 8 (eight) hours as needed for nausea or vomiting for up to 10 doses   phenazopyridine (PYRIDIUM) 200 mg tablet   No No   Sig: Take 1 tablet (200 mg total) by mouth 3 (three) times a day   sucralfate (CARAFATE) 1 g/10 mL suspension   No No   Sig: Take 10 mL (1 g total) by mouth 4 (four) times a day for 7 days      Facility-Administered Medications: None     Patient's Medications   Discharge Prescriptions    OMEPRAZOLE (PRILOSEC) 20 MG DELAYED RELEASE CAPSULE    Take 1 capsule (20 mg total) by mouth daily       Start Date: 9/22/2024 End Date: --       Order Dose: 20 mg       Quantity: 20 capsule    Refills: 0    ONDANSETRON (ZOFRAN) 4 MG TABLET    Take 1 tablet (4 mg total) by mouth every 6 (six) hours       Start Date: 9/22/2024 End Date: --       Order Dose: 4 mg       Quantity: 12 tablet     Refills: 0    SUCRALFATE (CARAFATE) 1 G TABLET    Take 1 tablet (1 g total) by mouth 4 (four) times a day       Start Date: 9/22/2024 End Date: --       Order Dose: 1 g       Quantity: 20 tablet    Refills: 0          Debra Villarreal DO  09/22/24 1733

## 2024-09-22 NOTE — ED NOTES
Lab was called about for CBC. Lab stated that there needs to be a differential on results.      Mateo Combs RN  09/22/24 0421

## 2024-11-26 ENCOUNTER — CONSULT (OUTPATIENT)
Dept: GASTROENTEROLOGY | Facility: AMBULARY SURGERY CENTER | Age: 44
End: 2024-11-26
Payer: COMMERCIAL

## 2024-11-26 VITALS
DIASTOLIC BLOOD PRESSURE: 80 MMHG | BODY MASS INDEX: 24.53 KG/M2 | SYSTOLIC BLOOD PRESSURE: 110 MMHG | OXYGEN SATURATION: 99 % | HEIGHT: 66 IN | HEART RATE: 58 BPM | WEIGHT: 152.6 LBS

## 2024-11-26 DIAGNOSIS — K29.70 GASTRITIS: ICD-10-CM

## 2024-11-26 DIAGNOSIS — R10.13 EPIGASTRIC PAIN: ICD-10-CM

## 2024-11-26 DIAGNOSIS — K59.00 CONSTIPATION, UNSPECIFIED CONSTIPATION TYPE: Primary | ICD-10-CM

## 2024-11-26 DIAGNOSIS — R10.9 ACUTE ABDOMINAL PAIN: ICD-10-CM

## 2024-11-26 DIAGNOSIS — D50.9 MICROCYTIC ANEMIA: ICD-10-CM

## 2024-11-26 PROCEDURE — 99244 OFF/OP CNSLTJ NEW/EST MOD 40: CPT | Performed by: INTERNAL MEDICINE

## 2024-11-26 RX ORDER — SODIUM CHLORIDE, SODIUM LACTATE, POTASSIUM CHLORIDE, CALCIUM CHLORIDE 600; 310; 30; 20 MG/100ML; MG/100ML; MG/100ML; MG/100ML
125 INJECTION, SOLUTION INTRAVENOUS CONTINUOUS
OUTPATIENT
Start: 2024-11-26

## 2024-11-26 RX ORDER — SUCRALFATE ORAL 1 G/10ML
1 SUSPENSION ORAL 2 TIMES DAILY
Qty: 420 ML | Refills: 0 | Status: SHIPPED | OUTPATIENT
Start: 2024-11-26 | End: 2024-12-26

## 2024-11-26 RX ORDER — POLYETHYLENE GLYCOL 3350 17 G/17G
17 POWDER, FOR SOLUTION ORAL DAILY
Qty: 510 G | Refills: 1 | Status: SHIPPED | OUTPATIENT
Start: 2024-11-26

## 2024-11-26 NOTE — ASSESSMENT & PLAN NOTE
-Upper abdominal pain likely due to peptic ulcer disease/gastritis versus less likely biliary colic.  -Would recommend starting on omeprazole 20 mg twice daily and Carafate 1 g twice daily, 2 hours separate from other medications.  If she is having breakthrough symptoms may take Pepcid as well.  -We went over GERD diet in length using .  -Recommend EGD for further evaluation.  -Avoid the use of NSAIDs, use acetaminophen when possible  -I obtained informed consent from the patient. The risks/benefits/alternatives of the procedure were discussed with the patient. Risks included, but not limited to, infection, bleeding, perforation, injury to organs in the abdomen, missed lesion and incomplete procedure were discussed. Patient was agreeable and electronic consent was signed.  Consent obtained using .  Orders:    Ambulatory Referral to Gastroenterology    omeprazole (PriLOSEC) 20 mg delayed release capsule; Take 1 capsule (20 mg total) by mouth 2 (two) times a day before meals

## 2024-11-26 NOTE — PROGRESS NOTES
Name: Angela Vides      : 1980      MRN: [id number]  Encounter Provider: Lani Louis MD  Encounter Date: 2024   Encounter department: [facility] [place]  :  Assessment & Plan  Gastritis  -Upper abdominal pain likely due to peptic ulcer disease/gastritis versus less likely biliary colic.  -Would recommend starting on omeprazole 20 mg twice daily and Carafate 1 g twice daily, 2 hours separate from other medications.  If she is having breakthrough symptoms may take Pepcid as well.  -We went over GERD diet in length using .  -Recommend EGD for further evaluation.  -Avoid the use of NSAIDs, use acetaminophen when possible  -I obtained informed consent from the patient. The risks/benefits/alternatives of the procedure were discussed with the patient. Risks included, but not limited to, infection, bleeding, perforation, injury to organs in the abdomen, missed lesion and incomplete procedure were discussed. Patient was agreeable and electronic consent was signed.  Consent obtained using .  Orders:    Ambulatory Referral to Gastroenterology    omeprazole (PriLOSEC) 20 mg delayed release capsule; Take 1 capsule (20 mg total) by mouth 2 (two) times a day before meals    Epigastric pain  -Possibly marginal ulcer versus gastritis.  -Recommend EGD for further evaluation.  Orders:    Ambulatory Referral to Gastroenterology    EGD; Future    Acute abdominal pain  -See above.  Orders:    sucralfate (CARAFATE) 1 g/10 mL suspension; Take 10 mL (1 g total) by mouth 2 (two) times a day    Constipation, unspecified constipation type  -Noted to have large stool burden on CAT scan.  Images were personally reviewed with the patient as well.  -Patient reports that she is moving her bowels more regularly than before.  I would recommend starting on MiraLAX 1 capful on daily basis regardless.  Patient would like a prescription for  this.  -Prescription sent to the pharmacy.    Orders:    polyethylene glycol (MIRALAX) 17 g packet; Take 17 g by mouth daily    Microcytic anemia  -Denies any overt bleeding including melena or hematochezia, does report menorrhagia.  -In absence of overt bleeding, will hold off on colonoscopy at this time.  Recommended screening colonoscopy at the age of 56 unless there is any evidence of overt bleeding or change in bowel habits.  Patient is in agreement with this.             History of Present Illness     HPI  Angela Vides is a 44 y.o. female with history of gastric sleeve surgery, presents for evaluation of epigastric abdominal pain.  Patient was in the emergency room in March and September with symptoms of abdominal pain.    Patient reports that she has been having burning epigastric abdominal pain for months, reports that she was prescribed omeprazole, Pepcid and Carafate for short period which helped with the symptoms however after she ran out of prescription, her symptoms have returned.  She reports that more recently her symptoms came back few weeks ago, reports that she started taking over-the-counter Pepcid and Prilosec yesterday with minimal relief.  She denies any hematemesis, coffee-ground emesis or melena.  Denies any NSAID use.  No unintentional weight loss.  No rectal bleeding.  No change in bowel habits.  She reports that her bowel movements are now fairly regular.  No family history of GI malignancy.  She denies ever having had an EGD or a colonoscopy.  She did have a gastric sleeve surgery 3 years ago.    Patient underwent CT of abdomen and pelvis in March 2024 notable for large amount of stool within the colon suggestive of constipation and uterine fibroids.    Labs in the ER were notable for normal lipase, hemoglobin 11.4, MCV 77, platelets 172.  Liver enzymes are normal, bilirubin is normal.      History is obtained using  # [id number].        Review of Systems    Constitutional: Negative.    HENT: Negative.     Eyes: Negative.    Respiratory: Negative.     Cardiovascular: Negative.    Gastrointestinal:         See HPI.   Endocrine: Negative.    Genitourinary: Negative.    Musculoskeletal: Negative.    Skin: Negative.    Allergic/Immunologic: Negative.    Neurological: Negative.    Hematological: Negative.    Psychiatric/Behavioral: Negative.     All other systems reviewed and are negative.    Past Medical History   History reviewed. No pertinent past medical history.  History reviewed. No pertinent surgical history.  History reviewed. No pertinent family history.   reports that she has never smoked. She has never used smokeless tobacco. She reports that she does not drink alcohol and does not use drugs.  Current Outpatient Medications on File Prior to Visit   Medication Sig Dispense Refill    [DISCONTINUED] omeprazole (PriLOSEC) 20 mg delayed release capsule Take 1 capsule (20 mg total) by mouth daily 20 capsule 0    acetaminophen (TYLENOL) 500 mg tablet Take 500 mg by mouth every 6 (six) hours as needed (Patient not taking: Reported on 11/26/2024)      cyclobenzaprine (FLEXERIL) 10 mg tablet Take 1 tablet (10 mg total) by mouth 2 (two) times a day as needed for muscle spasms for up to 15 days 30 tablet 0    famotidine (PEPCID) 20 mg tablet Take 1 tablet (20 mg total) by mouth 2 (two) times a day for 28 doses 28 tablet 0    hydrOXYzine HCL (ATARAX) 10 mg tablet Take 10 mg by mouth (Patient not taking: Reported on 11/26/2024)      ibuprofen (MOTRIN) 600 mg tablet Take 1 tablet (600 mg total) by mouth every 8 (eight) hours as needed for mild pain, moderate pain or fever for up to 15 doses (Patient not taking: Reported on 11/26/2024) 15 tablet 0    methylPREDNISolone 4 MG tablet therapy pack Use as directed on package (Patient not taking: Reported on 11/26/2024) 21 tablet 0    ondansetron (ZOFRAN) 4 mg tablet Take 1 tablet (4 mg total) by mouth every 8 (eight) hours as needed  "for nausea or vomiting for up to 7 doses (Patient not taking: Reported on 11/26/2024) 7 tablet 0    ondansetron (ZOFRAN) 4 mg tablet Take 1 tablet (4 mg total) by mouth every 6 (six) hours (Patient not taking: Reported on 11/26/2024) 12 tablet 0    ondansetron (ZOFRAN-ODT) 4 mg disintegrating tablet Take 1 tablet (4 mg total) by mouth every 8 (eight) hours as needed for nausea or vomiting for up to 10 doses (Patient not taking: Reported on 11/26/2024) 10 tablet 0    phenazopyridine (PYRIDIUM) 200 mg tablet Take 1 tablet (200 mg total) by mouth 3 (three) times a day (Patient not taking: Reported on 11/26/2024) 6 tablet 0    [DISCONTINUED] sucralfate (CARAFATE) 1 g tablet Take 1 tablet (1 g total) by mouth 4 (four) times a day (Patient not taking: Reported on 11/26/2024) 20 tablet 0    [DISCONTINUED] sucralfate (CARAFATE) 1 g/10 mL suspension Take 10 mL (1 g total) by mouth 4 (four) times a day for 7 days 420 mL 0     No current facility-administered medications on file prior to visit.   No Known Allergies        Objective   /80 (BP Location: Left arm, Patient Position: Sitting, Cuff Size: Standard)   Pulse 58   Ht 5' 6\" (1.676 m)   Wt 69.2 kg (152 lb 9.6 oz)   SpO2 99%   BMI 24.63 kg/m²      Physical Exam  Vitals and nursing note reviewed.   Constitutional:       General: She is not in acute distress.     Appearance: She is well-developed.   HENT:      Head: Normocephalic and atraumatic.   Eyes:      General: No scleral icterus.     Conjunctiva/sclera: Conjunctivae normal.   Cardiovascular:      Rate and Rhythm: Normal rate and regular rhythm.      Heart sounds: No murmur heard.  Pulmonary:      Effort: Pulmonary effort is normal. No respiratory distress.      Breath sounds: Normal breath sounds.   Abdominal:      Palpations: Abdomen is soft.      Tenderness: There is no abdominal tenderness.   Musculoskeletal:         General: No swelling.      Cervical back: Neck supple.   Skin:     General: Skin is warm " and dry.   Neurological:      Mental Status: She is alert.   Psychiatric:         Mood and Affect: Mood normal.

## 2024-11-26 NOTE — ASSESSMENT & PLAN NOTE
-Possibly marginal ulcer versus gastritis.  -Recommend EGD for further evaluation.  Orders:    Ambulatory Referral to Gastroenterology    EGD; Future

## 2024-11-26 NOTE — ASSESSMENT & PLAN NOTE
-Denies any overt bleeding including melena or hematochezia, does report menorrhagia.  -In absence of overt bleeding, will hold off on colonoscopy at this time.  Recommended screening colonoscopy at the age of 56 unless there is any evidence of overt bleeding or change in bowel habits.  Patient is in agreement with this.

## 2024-11-26 NOTE — PATIENT INSTRUCTIONS
Scheduled date of EGD(as of today): 2/27/25  Physician performing EGD: Dr. Louis  Location of EGD: [facility]  Instructions reviewed with patient by: Melodie DAVIS  Clearances: n/a

## 2024-11-26 NOTE — ASSESSMENT & PLAN NOTE
-Noted to have large stool burden on CAT scan.  Images were personally reviewed with the patient as well.  -Patient reports that she is moving her bowels more regularly than before.  I would recommend starting on MiraLAX 1 capful on daily basis regardless.  Patient would like a prescription for this.  -Prescription sent to the pharmacy.    Orders:    polyethylene glycol (MIRALAX) 17 g packet; Take 17 g by mouth daily

## 2025-02-06 ENCOUNTER — HOSPITAL ENCOUNTER (EMERGENCY)
Facility: HOSPITAL | Age: 45
Discharge: HOME | End: 2025-02-06
Attending: EMERGENCY MEDICINE
Payer: COMMERCIAL

## 2025-02-06 ENCOUNTER — APPOINTMENT (EMERGENCY)
Dept: RADIOLOGY | Facility: HOSPITAL | Age: 45
End: 2025-02-06
Payer: COMMERCIAL

## 2025-02-06 VITALS
BODY MASS INDEX: 24.09 KG/M2 | HEART RATE: 77 BPM | RESPIRATION RATE: 16 BRPM | DIASTOLIC BLOOD PRESSURE: 57 MMHG | TEMPERATURE: 98.2 F | OXYGEN SATURATION: 100 % | WEIGHT: 149.25 LBS | SYSTOLIC BLOOD PRESSURE: 118 MMHG

## 2025-02-06 DIAGNOSIS — R51.9 HEADACHE: ICD-10-CM

## 2025-02-06 DIAGNOSIS — R07.89 CHEST PRESSURE: Primary | ICD-10-CM

## 2025-02-06 LAB
ALBUMIN SERPL BCG-MCNC: 3.9 G/DL (ref 3.5–5)
ALP SERPL-CCNC: 39 U/L (ref 34–104)
ALT SERPL W P-5'-P-CCNC: 9 U/L (ref 7–52)
ANION GAP SERPL CALCULATED.3IONS-SCNC: 5 MMOL/L (ref 4–13)
AST SERPL W P-5'-P-CCNC: 23 U/L (ref 13–39)
ATRIAL RATE: 81 BPM
BASOPHILS # BLD AUTO: 0.08 THOUSANDS/ΜL (ref 0–0.1)
BASOPHILS NFR BLD AUTO: 2 % (ref 0–1)
BILIRUB SERPL-MCNC: 0.2 MG/DL (ref 0.2–1)
BUN SERPL-MCNC: 16 MG/DL (ref 5–25)
CALCIUM SERPL-MCNC: 8.9 MG/DL (ref 8.4–10.2)
CARDIAC TROPONIN I PNL SERPL HS: <2 NG/L (ref ?–50)
CHLORIDE SERPL-SCNC: 106 MMOL/L (ref 96–108)
CO2 SERPL-SCNC: 23 MMOL/L (ref 21–32)
CREAT SERPL-MCNC: 0.64 MG/DL (ref 0.6–1.3)
EOSINOPHIL # BLD AUTO: 0.18 THOUSAND/ΜL (ref 0–0.61)
EOSINOPHIL NFR BLD AUTO: 3 % (ref 0–6)
ERYTHROCYTE [DISTWIDTH] IN BLOOD BY AUTOMATED COUNT: 15.9 % (ref 11.6–15.1)
EXT PREGNANCY TEST URINE: NEGATIVE
EXT. CONTROL: NORMAL
GFR SERPL CREATININE-BSD FRML MDRD: 108 ML/MIN/1.73SQ M
GLUCOSE SERPL-MCNC: 82 MG/DL (ref 65–140)
HCT VFR BLD AUTO: 30 % (ref 34.8–46.1)
HGB BLD-MCNC: 9.2 G/DL (ref 11.5–15.4)
IMM GRANULOCYTES # BLD AUTO: 0.01 THOUSAND/UL (ref 0–0.2)
IMM GRANULOCYTES NFR BLD AUTO: 0 % (ref 0–2)
LIPASE SERPL-CCNC: 32 U/L (ref 11–82)
LYMPHOCYTES # BLD AUTO: 1.95 THOUSANDS/ΜL (ref 0.6–4.47)
LYMPHOCYTES NFR BLD AUTO: 36 % (ref 14–44)
MCH RBC QN AUTO: 23.4 PG (ref 26.8–34.3)
MCHC RBC AUTO-ENTMCNC: 30.7 G/DL (ref 31.4–37.4)
MCV RBC AUTO: 76 FL (ref 82–98)
MONOCYTES # BLD AUTO: 0.33 THOUSAND/ΜL (ref 0.17–1.22)
MONOCYTES NFR BLD AUTO: 6 % (ref 4–12)
NEUTROPHILS # BLD AUTO: 2.85 THOUSANDS/ΜL (ref 1.85–7.62)
NEUTS SEG NFR BLD AUTO: 53 % (ref 43–75)
NRBC BLD AUTO-RTO: 0 /100 WBCS
P AXIS: 75 DEGREES
PLATELET # BLD AUTO: 186 THOUSANDS/UL (ref 149–390)
PMV BLD AUTO: 12.6 FL (ref 8.9–12.7)
POTASSIUM SERPL-SCNC: 4.4 MMOL/L (ref 3.5–5.3)
PR INTERVAL: 190 MS
PROT SERPL-MCNC: 6.6 G/DL (ref 6.4–8.4)
QRS AXIS: 85 DEGREES
QRSD INTERVAL: 82 MS
QT INTERVAL: 352 MS
QTC INTERVAL: 408 MS
RBC # BLD AUTO: 3.94 MILLION/UL (ref 3.81–5.12)
SODIUM SERPL-SCNC: 134 MMOL/L (ref 135–147)
T WAVE AXIS: 74 DEGREES
VENTRICULAR RATE: 81 BPM
WBC # BLD AUTO: 5.4 THOUSAND/UL (ref 4.31–10.16)

## 2025-02-06 PROCEDURE — 36415 COLL VENOUS BLD VENIPUNCTURE: CPT

## 2025-02-06 PROCEDURE — 96372 THER/PROPH/DIAG INJ SC/IM: CPT

## 2025-02-06 PROCEDURE — 71046 X-RAY EXAM CHEST 2 VIEWS: CPT

## 2025-02-06 PROCEDURE — 93005 ELECTROCARDIOGRAM TRACING: CPT

## 2025-02-06 PROCEDURE — 83690 ASSAY OF LIPASE: CPT

## 2025-02-06 PROCEDURE — 93010 ELECTROCARDIOGRAM REPORT: CPT | Performed by: INTERNAL MEDICINE

## 2025-02-06 PROCEDURE — 81025 URINE PREGNANCY TEST: CPT

## 2025-02-06 PROCEDURE — 84484 ASSAY OF TROPONIN QUANT: CPT

## 2025-02-06 PROCEDURE — 99285 EMERGENCY DEPT VISIT HI MDM: CPT

## 2025-02-06 PROCEDURE — 80053 COMPREHEN METABOLIC PANEL: CPT

## 2025-02-06 PROCEDURE — 85025 COMPLETE CBC W/AUTO DIFF WBC: CPT

## 2025-02-06 RX ORDER — MAGNESIUM HYDROXIDE/ALUMINUM HYDROXICE/SIMETHICONE 120; 1200; 1200 MG/30ML; MG/30ML; MG/30ML
30 SUSPENSION ORAL ONCE
Status: COMPLETED | OUTPATIENT
Start: 2025-02-06 | End: 2025-02-06

## 2025-02-06 RX ORDER — FAMOTIDINE 20 MG/1
20 TABLET, FILM COATED ORAL ONCE
Status: COMPLETED | OUTPATIENT
Start: 2025-02-06 | End: 2025-02-06

## 2025-02-06 RX ORDER — KETOROLAC TROMETHAMINE 30 MG/ML
15 INJECTION, SOLUTION INTRAMUSCULAR; INTRAVENOUS ONCE
Status: COMPLETED | OUTPATIENT
Start: 2025-02-06 | End: 2025-02-06

## 2025-02-06 RX ADMIN — ALUMINUM HYDROXIDE, MAGNESIUM HYDROXIDE, AND DIMETHICONE 30 ML: 200; 20; 200 SUSPENSION ORAL at 21:11

## 2025-02-06 RX ADMIN — FAMOTIDINE 20 MG: 20 TABLET, FILM COATED ORAL at 21:11

## 2025-02-06 RX ADMIN — KETOROLAC TROMETHAMINE 15 MG: 30 INJECTION, SOLUTION INTRAMUSCULAR; INTRAVENOUS at 19:54

## 2025-02-07 NOTE — DISCHARGE INSTRUCTIONS
Follow-up with PCP.  Follow-up with GI.  Return to ED if symptoms worsen, fail to improve, or develop as listed in follow-up section.  Symptomatic care as discussed.

## 2025-02-13 ENCOUNTER — ANESTHESIA EVENT (OUTPATIENT)
Dept: ANESTHESIOLOGY | Facility: HOSPITAL | Age: 45
End: 2025-02-13

## 2025-02-13 ENCOUNTER — ANESTHESIA (OUTPATIENT)
Dept: ANESTHESIOLOGY | Facility: HOSPITAL | Age: 45
End: 2025-02-13

## 2025-04-23 ENCOUNTER — ANESTHESIA (OUTPATIENT)
Dept: ANESTHESIOLOGY | Facility: HOSPITAL | Age: 45
End: 2025-04-23

## 2025-04-23 ENCOUNTER — ANESTHESIA EVENT (OUTPATIENT)
Dept: ANESTHESIOLOGY | Facility: HOSPITAL | Age: 45
End: 2025-04-23

## 2025-05-02 ENCOUNTER — TELEPHONE (OUTPATIENT)
Age: 45
End: 2025-05-02

## 2025-05-02 NOTE — TELEPHONE ENCOUNTER
Pt rescheduled EGD for 6/3/25 w/ Dr. Gorman at [facility].    Pt requested to change from [facility] to [place] to be closer to her home.

## 2025-05-20 ENCOUNTER — ANESTHESIA EVENT (OUTPATIENT)
Dept: ANESTHESIOLOGY | Facility: HOSPITAL | Age: 45
End: 2025-05-20

## 2025-05-20 ENCOUNTER — ANESTHESIA (OUTPATIENT)
Dept: ANESTHESIOLOGY | Facility: HOSPITAL | Age: 45
End: 2025-05-20

## 2025-05-29 ENCOUNTER — TELEPHONE (OUTPATIENT)
Dept: GASTROENTEROLOGY | Facility: MEDICAL CENTER | Age: 45
End: 2025-05-29

## 2025-05-29 NOTE — TELEPHONE ENCOUNTER
Called and spoke to pt to cancel procedure due to insurance, pt states she is still waiting for her insurance and will call back once her insurance has been approved        please cancel  Received: Today  LA[facility]; [facility]  Good morning    This patients Medicaid plan covers [facility] [employer] [facility] only. They unfortunately need to be cancelled. Thank you.    Oralia

## 2025-06-20 ENCOUNTER — OFFICE VISIT (OUTPATIENT)
Dept: PAIN MEDICINE | Facility: CLINIC | Age: 45
End: 2025-06-20
Payer: COMMERCIAL

## 2025-06-20 VITALS — HEIGHT: 66 IN | BODY MASS INDEX: 23.95 KG/M2 | WEIGHT: 149 LBS

## 2025-06-20 DIAGNOSIS — M79.18 MYOFASCIAL PAIN SYNDROME: ICD-10-CM

## 2025-06-20 DIAGNOSIS — M54.12 CERVICAL RADICULITIS: Primary | ICD-10-CM

## 2025-06-20 PROCEDURE — 99214 OFFICE O/P EST MOD 30 MIN: CPT | Performed by: ANESTHESIOLOGY

## 2025-06-20 RX ORDER — CELECOXIB 200 MG/1
200 CAPSULE ORAL DAILY
Qty: 30 CAPSULE | Refills: 0 | Status: SHIPPED | OUTPATIENT
Start: 2025-06-20

## 2025-06-20 NOTE — PROGRESS NOTES
Name: Angela Vides      : 1980      MRN: [id number]  Encounter Provider: Omar Banerjee MD  Encounter Date: 2025   Encounter department: [facility] [place]  Assessment & Plan  Cervical radiculitis    Orders:    MRI cervical spine wo contrast; Future    celecoxib (CeleBREX) 200 mg capsule; Take 1 capsule (200 mg total) by mouth daily    Myofascial pain syndrome         Patient presenting for office follow up visit. Patient has a history of chronic neck thoracic, lumbar back pain for greater than 1 year, with waxing and waning symptoms over the years.     During today's evaluation patient is demonstrating pain that is multifactorial in nature, with the main pain generator likely stemming from cervical radiculitis and myofascial pain syndrome. Symptoms are accompanied by pain >7/10 on the pain scale with inability to participate in IADLs for >6 weeks.     Patient has participated with PT and HEP. Patient defers additional PT at this time due to time and financial constraints.     Has been taking Tylenol, ibuprofen, Flexeril with mild benefit.  Denies any gait instability, saddle anesthesia.    Defers muscle relaxants and sedating medications.    Will trial Celebrex 200mg daily.      Will obtain updated cervical MRI given worsening pain in the left C7-T1 dermatomes. Has not improved with PT/HEP.    Previously the ultrasound guided trigger point injections on 24 provided significant relief (at least greater than 50% improvement lasting 3+ months). Can plan for repeat TPIs, will follow up on MRI first.     Reviewed pertinent laboratory studies, specifically renal function, hemoglobin A1c, CBC, prior to initiating the recommended medication therapies/interventional treatment options.    My impressions and treatment recommendations were discussed in detail with the patient who verbalized understanding and had no further questions.  Discharge instructions were provided. I  "personally saw and examined the patient and I agree with the above discussed plan of care.    History of Present Illness     Angela Vides is a 45 y.o. female who presents for a follow up office visit in regards to Back Pain. The patient’s current symptoms include worsening neck and upper back pain symptoms. Pain is rated 10/10 and is described as a constant aching, pressure-like, cramping.    Review of Systems   Constitutional:  Negative for chills and fever.   HENT:  Negative for ear pain and sore throat.    Eyes:  Negative for pain and visual disturbance.   Respiratory:  Negative for cough and shortness of breath.    Cardiovascular:  Negative for chest pain and palpitations.   Gastrointestinal:  Negative for abdominal pain and vomiting.   Genitourinary:  Negative for dysuria and hematuria.   Musculoskeletal:  Positive for back pain. Negative for arthralgias.   Skin:  Negative for color change and rash.   Neurological:  Positive for weakness and numbness. Negative for seizures and syncope.   All other systems reviewed and are negative.      Medical History Reviewed by provider this encounter:     .  Medications Ordered Prior to Encounter[1]      Objective   Ht 5' 6\" (1.676 m)   Wt 67.6 kg (149 lb)   BMI 24.05 kg/m²      Pain Score: 10-Worst pain ever  Physical Exam  Constitutional: normal, well developed, well nourished, alert, in no distress and non-toxic and no overt pain behavior.  Eyes: anicteric  HEENT: grossly intact  Neck: supple, symmetric, trachea midline and no masses   Pulmonary: even and unlabored  Cardiovascular: No edema or pitting edema present  Skin: Normal without rashes or lesions and well hydrated  Psychiatric: Mood and affect appropriate  Neurologic:  Motor function is grossly intact with no focal neurologic deficits   Musculoskeletal: Tenderness in the left greater than right thoracic parapinal muscles, rhomboids, erector spinae, multifidus consistent with trigger " points.    Radiology Results Review: I personally reviewed the following image studies in PACS and associated radiology reports: MRI spine. My interpretation of the radiology images/reports is:  Lumbar MRI shows an annular fissure at L4-5 and mild bulge at L5-S1 without central or foraminal compromise. Cervical MRI shows mild protrusion at C3-4 with mass effect on thecal sac and bulging annulus at C4-5 without central or foraminal compromise..         [1]   Current Outpatient Medications on File Prior to Visit   Medication Sig Dispense Refill    ondansetron (ZOFRAN) 4 mg tablet Take 1 tablet (4 mg total) by mouth every 8 (eight) hours as needed for nausea or vomiting for up to 7 doses 7 tablet 0    polyethylene glycol (MIRALAX) 17 g packet Take 17 g by mouth daily 510 g 1    acetaminophen (TYLENOL) 500 mg tablet Take 500 mg by mouth every 6 (six) hours as needed (Patient not taking: Reported on 11/26/2024)      cyclobenzaprine (FLEXERIL) 10 mg tablet Take 1 tablet (10 mg total) by mouth 2 (two) times a day as needed for muscle spasms for up to 15 days 30 tablet 0    famotidine (PEPCID) 20 mg tablet Take 1 tablet (20 mg total) by mouth 2 (two) times a day for 28 doses 28 tablet 0    hydrOXYzine HCL (ATARAX) 10 mg tablet Take 10 mg by mouth (Patient not taking: Reported on 11/26/2024)      ibuprofen (MOTRIN) 600 mg tablet Take 1 tablet (600 mg total) by mouth every 8 (eight) hours as needed for mild pain, moderate pain or fever for up to 15 doses (Patient not taking: Reported on 6/20/2025) 15 tablet 0    methylPREDNISolone 4 MG tablet therapy pack Use as directed on package (Patient not taking: Reported on 11/26/2024) 21 tablet 0    omeprazole (PriLOSEC) 20 mg delayed release capsule Take 1 capsule (20 mg total) by mouth 2 (two) times a day before meals 180 capsule 0    ondansetron (ZOFRAN) 4 mg tablet Take 1 tablet (4 mg total) by mouth every 6 (six) hours (Patient not taking: Reported on 6/20/2025) 12 tablet 0     ondansetron (ZOFRAN-ODT) 4 mg disintegrating tablet Take 1 tablet (4 mg total) by mouth every 8 (eight) hours as needed for nausea or vomiting for up to 10 doses (Patient not taking: Reported on 6/20/2025) 10 tablet 0    phenazopyridine (PYRIDIUM) 200 mg tablet Take 1 tablet (200 mg total) by mouth 3 (three) times a day (Patient not taking: Reported on 6/20/2025) 6 tablet 0    sucralfate (CARAFATE) 1 g/10 mL suspension Take 10 mL (1 g total) by mouth 2 (two) times a day 420 mL 0     No current facility-administered medications on file prior to visit.

## 2025-06-24 ENCOUNTER — TELEPHONE (OUTPATIENT)
Age: 45
End: 2025-06-24

## 2025-06-24 DIAGNOSIS — M54.12 CERVICAL RADICULOPATHY: ICD-10-CM

## 2025-06-24 DIAGNOSIS — M79.18 MYOFASCIAL PAIN SYNDROME: Primary | ICD-10-CM

## 2025-06-24 NOTE — TELEPHONE ENCOUNTER
Caller: Angela    Doctor: Dr. Banerjee    Reason for call: pt calling stating her insurance denied her MRI.   They said she needed more physical therapy sessions.   She feels that has not worked in the past.   She is asking for other options.  Please advise pt      # [id number]    Call back#: [phone]

## 2025-06-27 NOTE — TELEPHONE ENCOUNTER
Caller: akhil Posadas     Doctor: Dr. Banerjee     Reason for call: pt calling for an update in regards to previous message for MRI. Please Advise       Call back#: [phone]

## 2025-07-01 ENCOUNTER — TELEPHONE (OUTPATIENT)
Age: 45
End: 2025-07-01

## 2025-07-01 NOTE — TELEPHONE ENCOUNTER
Patient called asking to speak to [facility].  Warm transfer to [facility].

## 2025-07-01 NOTE — TELEPHONE ENCOUNTER
"Caller: akhil Miller    Doctor: Dr. Banerjee    Reason for call: pt called stating that her MRI was denied because of \"neck pain\"  pt said that she never told the dr it was neck pain.  The pain is her upper back between her shoulders and between her shoulder blades.  Pt wants to know why the documentation is saying neck pain    Please advise    Call back#: [phone]  "

## 2025-07-02 NOTE — TELEPHONE ENCOUNTER
S/W pt.  Advised pt of the same.  Pt stated she does not have pain in her neck and it is in the middle of her back.  Advised have appt 7/11 and can be reevaluated.  Attempted to offer sooner appt. Call was disconnected.      Attempted to call again.  Call disconnected.    S/W pt.  Pt stated I don't know why you call people at 8 am.  Advised that is when [facility] is open.  She stated she is going to be late for her job. Call was disconnected.

## 2025-07-02 NOTE — TELEPHONE ENCOUNTER
Patient did not want a sooner appt. Patient stated that she was not having neck pain and would like thoracic MRI vs cervical. Patient was also open to scheduling another round of TPI. After discussing with WS, order placed for trigger points.

## 2025-07-10 ENCOUNTER — APPOINTMENT (EMERGENCY)
Dept: RADIOLOGY | Facility: HOSPITAL | Age: 45
End: 2025-07-10
Payer: COMMERCIAL

## 2025-07-10 ENCOUNTER — HOSPITAL ENCOUNTER (EMERGENCY)
Facility: HOSPITAL | Age: 45
Discharge: HOME | End: 2025-07-10
Attending: EMERGENCY MEDICINE
Payer: COMMERCIAL

## 2025-07-10 VITALS
BODY MASS INDEX: 25.44 KG/M2 | RESPIRATION RATE: 18 BRPM | TEMPERATURE: 98 F | SYSTOLIC BLOOD PRESSURE: 121 MMHG | DIASTOLIC BLOOD PRESSURE: 78 MMHG | WEIGHT: 157.63 LBS | OXYGEN SATURATION: 100 % | HEART RATE: 83 BPM

## 2025-07-10 DIAGNOSIS — M54.12 CERVICAL RADICULOPATHY: ICD-10-CM

## 2025-07-10 DIAGNOSIS — D64.9 ANEMIA: ICD-10-CM

## 2025-07-10 DIAGNOSIS — M54.9 UPPER BACK PAIN ON LEFT SIDE: Primary | ICD-10-CM

## 2025-07-10 LAB
ALBUMIN SERPL BCG-MCNC: 3.8 G/DL (ref 3.5–5)
ALP SERPL-CCNC: 49 U/L (ref 34–104)
ALT SERPL W P-5'-P-CCNC: 13 U/L (ref 7–52)
ANION GAP SERPL CALCULATED.3IONS-SCNC: 3 MMOL/L (ref 4–13)
AST SERPL W P-5'-P-CCNC: 18 U/L (ref 13–39)
BASOPHILS # BLD AUTO: 0.05 THOUSANDS/ÂΜL (ref 0–0.1)
BASOPHILS NFR BLD AUTO: 1 % (ref 0–1)
BILIRUB SERPL-MCNC: 0.22 MG/DL (ref 0.2–1)
BILIRUB UR QL STRIP: NEGATIVE
BUN SERPL-MCNC: 19 MG/DL (ref 5–25)
CALCIUM SERPL-MCNC: 8.6 MG/DL (ref 8.4–10.2)
CARDIAC TROPONIN I PNL SERPL HS: <2 NG/L (ref ?–50)
CHLORIDE SERPL-SCNC: 106 MMOL/L (ref 96–108)
CLARITY UR: CLEAR
CO2 SERPL-SCNC: 28 MMOL/L (ref 21–32)
COLOR UR: YELLOW
CREAT SERPL-MCNC: 0.53 MG/DL (ref 0.6–1.3)
EOSINOPHIL # BLD AUTO: 0.19 THOUSAND/ÂΜL (ref 0–0.61)
EOSINOPHIL NFR BLD AUTO: 4 % (ref 0–6)
ERYTHROCYTE [DISTWIDTH] IN BLOOD BY AUTOMATED COUNT: 17.2 % (ref 11.6–15.1)
EXT PREGNANCY TEST URINE: NEGATIVE
EXT. CONTROL: NORMAL
GFR SERPL CREATININE-BSD FRML MDRD: 115 ML/MIN/1.73SQ M
GLUCOSE SERPL-MCNC: 78 MG/DL (ref 65–140)
GLUCOSE UR STRIP-MCNC: NEGATIVE MG/DL
HCT VFR BLD AUTO: 26.4 % (ref 34.8–46.1)
HGB BLD-MCNC: 8.3 G/DL (ref 11.5–15.4)
HGB UR QL STRIP.AUTO: NEGATIVE
IMM GRANULOCYTES # BLD AUTO: 0.02 THOUSAND/UL (ref 0–0.2)
IMM GRANULOCYTES NFR BLD AUTO: 1 % (ref 0–2)
KETONES UR STRIP-MCNC: NEGATIVE MG/DL
LEUKOCYTE ESTERASE UR QL STRIP: NEGATIVE
LYMPHOCYTES # BLD AUTO: 1.52 THOUSANDS/ÂΜL (ref 0.6–4.47)
LYMPHOCYTES NFR BLD AUTO: 34 % (ref 14–44)
MCH RBC QN AUTO: 23 PG (ref 26.8–34.3)
MCHC RBC AUTO-ENTMCNC: 31.4 G/DL (ref 31.4–37.4)
MCV RBC AUTO: 73 FL (ref 82–98)
MONOCYTES # BLD AUTO: 0.32 THOUSAND/ÂΜL (ref 0.17–1.22)
MONOCYTES NFR BLD AUTO: 7 % (ref 4–12)
NEUTROPHILS # BLD AUTO: 2.34 THOUSANDS/ÂΜL (ref 1.85–7.62)
NEUTS SEG NFR BLD AUTO: 53 % (ref 43–75)
NITRITE UR QL STRIP: NEGATIVE
NRBC BLD AUTO-RTO: 0 /100 WBCS
PH UR STRIP.AUTO: 7.5 [PH] (ref 4.5–8)
PLATELET # BLD AUTO: 193 THOUSANDS/UL (ref 149–390)
PMV BLD AUTO: 11.8 FL (ref 8.9–12.7)
POTASSIUM SERPL-SCNC: 4.1 MMOL/L (ref 3.5–5.3)
PROT SERPL-MCNC: 6 G/DL (ref 6.4–8.4)
PROT UR STRIP-MCNC: NEGATIVE MG/DL
RBC # BLD AUTO: 3.61 MILLION/UL (ref 3.81–5.12)
SODIUM SERPL-SCNC: 137 MMOL/L (ref 135–147)
SP GR UR STRIP.AUTO: 1.02 (ref 1–1.03)
UROBILINOGEN UR QL STRIP.AUTO: 0.2 E.U./DL
WBC # BLD AUTO: 4.44 THOUSAND/UL (ref 4.31–10.16)

## 2025-07-10 PROCEDURE — 85025 COMPLETE CBC W/AUTO DIFF WBC: CPT | Performed by: PHYSICIAN ASSISTANT

## 2025-07-10 PROCEDURE — 80053 COMPREHEN METABOLIC PANEL: CPT | Performed by: PHYSICIAN ASSISTANT

## 2025-07-10 PROCEDURE — 96374 THER/PROPH/DIAG INJ IV PUSH: CPT

## 2025-07-10 PROCEDURE — 93005 ELECTROCARDIOGRAM TRACING: CPT

## 2025-07-10 PROCEDURE — 81003 URINALYSIS AUTO W/O SCOPE: CPT

## 2025-07-10 PROCEDURE — 71046 X-RAY EXAM CHEST 2 VIEWS: CPT

## 2025-07-10 PROCEDURE — 99284 EMERGENCY DEPT VISIT MOD MDM: CPT

## 2025-07-10 PROCEDURE — 99285 EMERGENCY DEPT VISIT HI MDM: CPT | Performed by: PHYSICIAN ASSISTANT

## 2025-07-10 PROCEDURE — 84484 ASSAY OF TROPONIN QUANT: CPT | Performed by: PHYSICIAN ASSISTANT

## 2025-07-10 PROCEDURE — 81025 URINE PREGNANCY TEST: CPT | Performed by: PHYSICIAN ASSISTANT

## 2025-07-10 PROCEDURE — 36415 COLL VENOUS BLD VENIPUNCTURE: CPT | Performed by: PHYSICIAN ASSISTANT

## 2025-07-10 RX ORDER — METHOCARBAMOL 500 MG/1
500 TABLET, FILM COATED ORAL ONCE
Status: COMPLETED | OUTPATIENT
Start: 2025-07-10 | End: 2025-07-10

## 2025-07-10 RX ORDER — KETOROLAC TROMETHAMINE 30 MG/ML
15 INJECTION, SOLUTION INTRAMUSCULAR; INTRAVENOUS ONCE
Status: COMPLETED | OUTPATIENT
Start: 2025-07-10 | End: 2025-07-10

## 2025-07-10 RX ORDER — METHOCARBAMOL 500 MG/1
500 TABLET, FILM COATED ORAL 3 TIMES DAILY
Qty: 12 TABLET | Refills: 0 | Status: SHIPPED | OUTPATIENT
Start: 2025-07-10

## 2025-07-10 RX ADMIN — METHOCARBAMOL 500 MG: 500 TABLET ORAL at 15:49

## 2025-07-10 RX ADMIN — KETOROLAC TROMETHAMINE 15 MG: 30 INJECTION, SOLUTION INTRAMUSCULAR at 15:50

## 2025-07-10 NOTE — ED PROVIDER NOTES
Time reflects when diagnosis was documented in both MDM as applicable and the Disposition within this note       Time User Action Codes Description Comment    7/10/2025  5:30 PM Mile Muller Add [M54.9] Upper back pain on left side     7/10/2025  5:30 PM Mile Muller Add [M54.12] Cervical radiculopathy     7/10/2025  5:30 PM Mile Muller Add [D64.9] Anemia           ED Disposition       ED Disposition   Discharge    Condition   Stable    Date/Time   Thu Jul 10, 2025  5:30 PM    Comment   Angela DAVID Janine discharge to home/self care.                   Assessment & Plan       Medical Decision Making  DDx including but not limited to: sciatica, herniated disc, arthritis, spinal stenosis, strain, sprain, fracture, cauda equina syndrome, epidural abscess, transverse myelitis, AAA.      Plan will check basic labs, EKG, troponin, chest x-ray.  Will give IV Toradol and p.o. Robaxin here.    Lab evaluation reveals anemia.  Patient with previous anemia with Hgb in the 9-10 range.  Discussed results with patient.  Patient admits she supposed to be taking iron but does not take this on a regular basis.  To denies associated symptoms.  Discussed importance of taking her iron as directed and close follow-up with PCP.  Also given contact information for heme-onc as she may need further outpatient evaluation of anemia.    EKG normal sinus rhythm, rate 58, no ST segment elevation or depression.  Troponin normal.  Doubt ACS etiology of back pain and left arm pain.  Chest x-ray with no acute cardiopulmonary disease.    Discussed all results with patient.  Patient with improvement of pain after the Robaxin and Toradol here.  Discussed likely musculoskeletal etiology and cervical radiculopathy as etiology of her pain.  Continue Celebrex as previously directed.  Will add Robaxin for short course.  Continue outpatient follow-up with spine and pain.  Discussed strict return precautions if symptoms worsen or new symptoms arise.   Patient states understanding and agrees with plan.    Amount and/or Complexity of Data Reviewed  Labs: ordered. Decision-making details documented in ED Course.  Radiology: ordered and independent interpretation performed. Decision-making details documented in ED Course.    Risk  Prescription drug management.             Medications   ketorolac (TORADOL) injection 15 mg (15 mg Intravenous Given 7/10/25 1550)   methocarbamol (ROBAXIN) tablet 500 mg (500 mg Oral Given 7/10/25 7949)       ED Risk Strat Scores                    No data recorded                            History of Present Illness       Chief Complaint   Patient presents with    Back Pain     Patient reporting left upper back pain x 2 days. Taking OTC meds with no relief.       Past Medical History[1]   Past Surgical History[2]   Family History[3]   Social History[4]   E-Cigarette/Vaping    E-Cigarette Use Never User       E-Cigarette/Vaping Substances    Nicotine No     THC No     CBD No     Flavoring No     Other No     Unknown No       I have reviewed and agree with the history as documented.     Patient is a 45-year-old female with no significant past medical history presents for evaluation of left upper back and left arm pain.  Patient states symptoms started 1 week ago.  She describes it as a sharp pinching pain that is constant.  She states she sees spine and pain for chronic back pain and saw them this week for her pain. She was dx with cervical radiculopathy and they ordered an outpatient MRI cervical spine however her insurance declined this.  She was also started on Celebrex and lidocaine patches which she has been taking without relief.  She had participated in PT in the past and declined additional at this time. She denies any fall or trauma.  She denies fever, chills, chest pain, shortness of breath, abdominal pain, nausea vomiting diarrhea, neck pain, numbness, weakness, recent illness, rash, dysuria, hematuria, frequency, flank pain.  No IVDU        Review of Systems   Constitutional:  Negative for chills and fever.   HENT:  Negative for ear pain and sore throat.    Eyes:  Negative for pain and visual disturbance.   Respiratory:  Negative for cough and shortness of breath.    Cardiovascular:  Negative for chest pain.   Gastrointestinal:  Negative for abdominal pain, diarrhea, nausea and vomiting.   Genitourinary:  Negative for decreased urine volume, difficulty urinating, dysuria, flank pain and hematuria.   Musculoskeletal:  Positive for back pain and myalgias. Negative for arthralgias and neck pain.        Left arm pain   Skin:  Negative for color change and rash.   Neurological:  Negative for seizures, syncope, weakness and numbness.   All other systems reviewed and are negative.          Objective       ED Triage Vitals   Temperature Pulse Blood Pressure Respirations SpO2 Patient Position - Orthostatic VS   07/10/25 1433 07/10/25 1431 07/10/25 1431 07/10/25 1431 07/10/25 1431 --   98 °F (36.7 °C) 83 121/78 18 100 %       Temp src Heart Rate Source BP Location FiO2 (%) Pain Score    -- -- -- -- 07/10/25 1550        10 - Worst Possible Pain      Vitals      Date and Time Temp Pulse SpO2 Resp BP Pain Score FACES Pain Rating User   07/10/25 1644 -- -- -- -- -- 6 -- AEN   07/10/25 1550 -- -- -- -- -- 10 - Worst Possible Pain -- AEN   07/10/25 1433 98 °F (36.7 °C) -- -- -- -- -- --    07/10/25 1431 -- 83 100 % 18 121/78 -- --             Physical Exam  Vitals and nursing note reviewed.   Constitutional:       General: She is not in acute distress.     Appearance: Normal appearance. She is well-developed. She is not ill-appearing, toxic-appearing or diaphoretic.   HENT:      Head: Normocephalic and atraumatic.      Mouth/Throat:      Mouth: Mucous membranes are moist.     Eyes:      Conjunctiva/sclera: Conjunctivae normal.       Cardiovascular:      Rate and Rhythm: Normal rate and regular rhythm.      Pulses: Normal pulses.      Heart sounds:  Normal heart sounds. No murmur heard.  Pulmonary:      Effort: Pulmonary effort is normal. No respiratory distress.      Breath sounds: Normal breath sounds. No stridor. No wheezing, rhonchi or rales.   Abdominal:      General: Abdomen is flat. There is no distension.      Palpations: Abdomen is soft.      Tenderness: There is no abdominal tenderness. There is no guarding.     Musculoskeletal:         General: No swelling. Normal range of motion.      Cervical back: Normal range of motion and neck supple. No swelling, edema, deformity, erythema, signs of trauma, lacerations, rigidity, tenderness or crepitus. Normal range of motion.      Thoracic back: Tenderness present. No swelling, edema, deformity, signs of trauma, lacerations or bony tenderness. Normal range of motion.        Back:       Comments: Reproducible tenderness palpation over the left paravertebral muscles of lower cervical and upper thoracic back and parascapular muscles.  Also with some tenderness to the proximal humerus/shoulder.  No midline spinous process tenderness palpation.  No deformity or step-off.  Range of motion of the spine and upper extremity intact.  Neuro vastly intact distally.  Radial pulse intact.  No skin changes, swelling, crepitus.   Lymphadenopathy:      Cervical: No cervical adenopathy.     Skin:     General: Skin is warm and dry.      Capillary Refill: Capillary refill takes less than 2 seconds.     Neurological:      General: No focal deficit present.      Mental Status: She is alert.     Psychiatric:         Mood and Affect: Mood normal.         Results Reviewed       Procedure Component Value Units Date/Time    HS Troponin 0hr (reflex protocol) [[id number]]  (Normal) Collected: 07/10/25 1541    Lab Status: Final result Specimen: Blood from Arm, Right Updated: 07/10/25 1608     hs TnI 0hr <2 ng/L     Comprehensive metabolic panel [[id number]]  (Abnormal) Collected: 07/10/25 1541    Lab Status: Final result Specimen: Blood from  Arm, Right Updated: 07/10/25 1602     Sodium 137 mmol/L      Potassium 4.1 mmol/L      Chloride 106 mmol/L      CO2 28 mmol/L      ANION GAP 3 mmol/L      BUN 19 mg/dL      Creatinine 0.53 mg/dL      Glucose 78 mg/dL      Calcium 8.6 mg/dL      AST 18 U/L      ALT 13 U/L      Alkaline Phosphatase 49 U/L      Total Protein 6.0 g/dL      Albumin 3.8 g/dL      Total Bilirubin 0.22 mg/dL      eGFR 115 ml/min/1.73sq m     Narrative:      [employer] guidelines for Chronic Kidney Disease (CKD):     Stage 1 with normal or high GFR (GFR > 90 mL/min/1.73 square meters)    Stage 2 Mild CKD (GFR = 60-89 mL/min/1.73 square meters)    Stage 3A Moderate CKD (GFR = 45-59 mL/min/1.73 square meters)    Stage 3B Moderate CKD (GFR = 30-44 mL/min/1.73 square meters)    Stage 4 Severe CKD (GFR = 15-29 mL/min/1.73 square meters)    Stage 5 End Stage CKD (GFR <15 mL/min/1.73 square meters)  Note: GFR calculation is accurate only with a steady state creatinine    Urine Macroscopic, POC [[id number]] Collected: 07/10/25 1547    Lab Status: Final result Specimen: Urine Updated: 07/10/25 1548     Color, UA Yellow     Clarity, UA Clear     pH, UA 7.5     Leukocytes, UA Negative     Nitrite, UA Negative     Protein, UA Negative mg/dl      Glucose, UA Negative mg/dl      Ketones, UA Negative mg/dl      Urobilinogen, UA 0.2 E.U./dl      Bilirubin, UA Negative     Occult Blood, UA Negative     Specific Gravity, UA 1.020    Narrative:      CLINITEK RESULT    POCT pregnancy, urine [[id number]]  (Normal) Collected: 07/10/25 1547    Lab Status: Final result Updated: 07/10/25 1547     EXT Preg Test, Ur Negative     Control Valid    CBC and differential [[id number]]  (Abnormal) Collected: 07/10/25 1541    Lab Status: Final result Specimen: Blood from Arm, Right Updated: 07/10/25 1547     WBC 4.44 Thousand/uL      RBC 3.61 Million/uL      Hemoglobin 8.3 g/dL      Hematocrit 26.4 %      MCV 73 fL      MCH 23.0 pg      MCHC 31.4 g/dL       RDW 17.2 %      MPV 11.8 fL      Platelets 193 Thousands/uL      nRBC 0 /100 WBCs      Segmented % 53 %      Immature Grans % 1 %      Lymphocytes % 34 %      Monocytes % 7 %      Eosinophils Relative 4 %      Basophils Relative 1 %      Absolute Neutrophils 2.34 Thousands/µL      Absolute Immature Grans 0.02 Thousand/uL      Absolute Lymphocytes 1.52 Thousands/µL      Absolute Monocytes 0.32 Thousand/µL      Eosinophils Absolute 0.19 Thousand/µL      Basophils Absolute 0.05 Thousands/µL             XR chest 2 views   Final Interpretation by Joana Ballard MD (07/10 1721)      No acute cardiopulmonary disease.            Workstation performed: [id number]             Procedures    ED Medication and Procedure Management   Prior to Admission Medications   Prescriptions Last Dose Informant Patient Reported? Taking?   acetaminophen (TYLENOL) 500 mg tablet  Self Yes No   Sig: Take 500 mg by mouth every 6 (six) hours as needed   Patient not taking: Reported on 11/26/2024   celecoxib (CeleBREX) 200 mg capsule   No No   Sig: Take 1 capsule (200 mg total) by mouth daily   cyclobenzaprine (FLEXERIL) 10 mg tablet   No No   Sig: Take 1 tablet (10 mg total) by mouth 2 (two) times a day as needed for muscle spasms for up to 15 days   famotidine (PEPCID) 20 mg tablet   No No   Sig: Take 1 tablet (20 mg total) by mouth 2 (two) times a day for 28 doses   hydrOXYzine HCL (ATARAX) 10 mg tablet  Self Yes No   Sig: Take 10 mg by mouth   Patient not taking: Reported on 11/26/2024   ibuprofen (MOTRIN) 600 mg tablet   No No   Sig: Take 1 tablet (600 mg total) by mouth every 8 (eight) hours as needed for mild pain, moderate pain or fever for up to 15 doses   Patient not taking: Reported on 6/20/2025   methylPREDNISolone 4 MG tablet therapy pack  Self No No   Sig: Use as directed on package   Patient not taking: Reported on 11/26/2024   omeprazole (PriLOSEC) 20 mg delayed release capsule   No No   Sig: Take 1 capsule (20 mg total) by  mouth 2 (two) times a day before meals   ondansetron (ZOFRAN) 4 mg tablet   No No   Sig: Take 1 tablet (4 mg total) by mouth every 8 (eight) hours as needed for nausea or vomiting for up to 7 doses   ondansetron (ZOFRAN) 4 mg tablet   No No   Sig: Take 1 tablet (4 mg total) by mouth every 6 (six) hours   Patient not taking: Reported on 6/20/2025   ondansetron (ZOFRAN-ODT) 4 mg disintegrating tablet   No No   Sig: Take 1 tablet (4 mg total) by mouth every 8 (eight) hours as needed for nausea or vomiting for up to 10 doses   Patient not taking: Reported on 6/20/2025   phenazopyridine (PYRIDIUM) 200 mg tablet   No No   Sig: Take 1 tablet (200 mg total) by mouth 3 (three) times a day   Patient not taking: Reported on 6/20/2025   polyethylene glycol (MIRALAX) 17 g packet   No No   Sig: Take 17 g by mouth daily   sucralfate (CARAFATE) 1 g/10 mL suspension   No No   Sig: Take 10 mL (1 g total) by mouth 2 (two) times a day      Facility-Administered Medications: None     Discharge Medication List as of 7/10/2025  5:33 PM        CONTINUE these medications which have NOT CHANGED    Details   acetaminophen (TYLENOL) 500 mg tablet Take 500 mg by mouth every 6 (six) hours as needed, Historical Med      celecoxib (CeleBREX) 200 mg capsule Take 1 capsule (200 mg total) by mouth daily, Starting Fri 6/20/2025, Normal      cyclobenzaprine (FLEXERIL) 10 mg tablet Take 1 tablet (10 mg total) by mouth 2 (two) times a day as needed for muscle spasms for up to 15 days, Starting Mon 1/15/2024, Until Tue 1/30/2024 at 2359, Normal      famotidine (PEPCID) 20 mg tablet Take 1 tablet (20 mg total) by mouth 2 (two) times a day for 28 doses, Starting Sun 3/3/2024, Until Sun 3/17/2024, Normal      hydrOXYzine HCL (ATARAX) 10 mg tablet Take 10 mg by mouth, Starting Tue 5/16/2023, Historical Med      ibuprofen (MOTRIN) 600 mg tablet Take 1 tablet (600 mg total) by mouth every 8 (eight) hours as needed for mild pain, moderate pain or fever for up to  15 doses, Starting Sun 12/31/2023, Normal      methylPREDNISolone 4 MG tablet therapy pack Use as directed on package, Normal      omeprazole (PriLOSEC) 20 mg delayed release capsule Take 1 capsule (20 mg total) by mouth 2 (two) times a day before meals, Starting Tue 11/26/2024, Until Mon 2/24/2025, Normal      !! ondansetron (ZOFRAN) 4 mg tablet Take 1 tablet (4 mg total) by mouth every 8 (eight) hours as needed for nausea or vomiting for up to 7 doses, Starting Sun 3/3/2024, Normal      !! ondansetron (ZOFRAN) 4 mg tablet Take 1 tablet (4 mg total) by mouth every 6 (six) hours, Starting Sun 9/22/2024, Normal      ondansetron (ZOFRAN-ODT) 4 mg disintegrating tablet Take 1 tablet (4 mg total) by mouth every 8 (eight) hours as needed for nausea or vomiting for up to 10 doses, Starting Sun 12/31/2023, Normal      phenazopyridine (PYRIDIUM) 200 mg tablet Take 1 tablet (200 mg total) by mouth 3 (three) times a day, Starting Sun 12/17/2023, Normal      polyethylene glycol (MIRALAX) 17 g packet Take 17 g by mouth daily, Starting Tue 11/26/2024, Normal      sucralfate (CARAFATE) 1 g/10 mL suspension Take 10 mL (1 g total) by mouth 2 (two) times a day, Starting Tue 11/26/2024, Until Thu 12/26/2024, Normal       !! - Potential duplicate medications found. Please discuss with provider.        No discharge procedures on file.  ED SEPSIS DOCUMENTATION   Time reflects when diagnosis was documented in both MDM as applicable and the Disposition within this note       Time User Action Codes Description Comment    7/10/2025  5:30 PM Mile Muller Add [M54.9] Upper back pain on left side     7/10/2025  5:30 PM Mile Muller Add [M54.12] Cervical radiculopathy     7/10/2025  5:30 PM Mile Muller Add [D64.9] Anemia                      [1] No past medical history on file.  [2] No past surgical history on file.  [3] No family history on file.  [4]   Social History  Tobacco Use    Smoking status: Never    Smokeless tobacco: Never    Vaping Use    Vaping status: Never Used   Substance Use Topics    Alcohol use: Never    Drug use: Never        Mile Muller PA-C  07/10/25 1811

## 2025-07-11 ENCOUNTER — PROCEDURE VISIT (OUTPATIENT)
Dept: PAIN MEDICINE | Facility: CLINIC | Age: 45
End: 2025-07-11
Payer: COMMERCIAL

## 2025-07-11 DIAGNOSIS — M79.18 MYOFASCIAL PAIN SYNDROME: Primary | ICD-10-CM

## 2025-07-11 LAB
ATRIAL RATE: 58 BPM
P AXIS: 71 DEGREES
PR INTERVAL: 178 MS
QRS AXIS: 91 DEGREES
QRSD INTERVAL: 74 MS
QT INTERVAL: 418 MS
QTC INTERVAL: 410 MS
T WAVE AXIS: 86 DEGREES
VENTRICULAR RATE: 58 BPM

## 2025-07-11 PROCEDURE — 76942 ECHO GUIDE FOR BIOPSY: CPT | Performed by: ANESTHESIOLOGY

## 2025-07-11 PROCEDURE — 20553 NJX 1/MLT TRIGGER POINTS 3/>: CPT | Performed by: ANESTHESIOLOGY

## 2025-07-11 PROCEDURE — 93010 ELECTROCARDIOGRAM REPORT: CPT

## 2025-07-11 RX ORDER — METHYLPREDNISOLONE ACETATE 40 MG/ML
40 INJECTION, SUSPENSION INTRA-ARTICULAR; INTRALESIONAL; INTRAMUSCULAR; SOFT TISSUE ONCE
Status: COMPLETED | OUTPATIENT
Start: 2025-07-11 | End: 2025-07-11

## 2025-07-11 RX ORDER — BUPIVACAINE HYDROCHLORIDE 5 MG/ML
7 INJECTION, SOLUTION EPIDURAL; INTRACAUDAL; PERINEURAL ONCE
Status: COMPLETED | OUTPATIENT
Start: 2025-07-11 | End: 2025-07-11

## 2025-07-11 RX ADMIN — METHYLPREDNISOLONE ACETATE 40 MG: 40 INJECTION, SUSPENSION INTRA-ARTICULAR; INTRALESIONAL; INTRAMUSCULAR; SOFT TISSUE at 11:00

## 2025-07-11 RX ADMIN — BUPIVACAINE HYDROCHLORIDE 7 ML: 5 INJECTION, SOLUTION EPIDURAL; INTRACAUDAL; PERINEURAL at 11:00

## 2025-07-11 NOTE — PROGRESS NOTES
Indication: upper back and periscapular pain  Preoperative diagnosis: Myofascial pain  Postoperative diagnosis: Same     Procedure: Ultrasound guided trigger point injections     Muscles injected: left thoracic paraspinal, rhomboid, periscapular muscles 4 sites total     Medications: 8mL used of 10mL mixture containing 40mg Kenalog with 9mL 0.5% bupivacaine      After discussing the risks, benefits, and alternatives to the procedure, the patient expressed understanding and wished to proceed. The patient was placed in sitting position. A procedural pause was conducted to verify: Correct patient identity, procedure to be performed and as applicable, correct side and site, correct patient position, and availability of implants, special equipment or special requirements.     Following this, the area was prepped with Chloraprep. A 2 inch 25 gauge needle was advanced into the identified trigger points with live ultrasound guidance using a 12MHz linear probe. After negative aspiration of blood, air, or bodily fluids; 1-2cc of the above injectate was injected into each trigger point.     The patient tolerated the procedure well and there were no apparent complications. After an appropriate amount of observation, the patient was dismissed from thein good condition under their own power.

## 2025-07-17 DIAGNOSIS — M54.12 CERVICAL RADICULITIS: ICD-10-CM

## 2025-07-17 RX ORDER — CELECOXIB 200 MG/1
200 CAPSULE ORAL DAILY
Qty: 30 CAPSULE | Refills: 2 | Status: SHIPPED | OUTPATIENT
Start: 2025-07-17

## 2025-07-25 ENCOUNTER — TELEPHONE (OUTPATIENT)
Dept: PAIN MEDICINE | Facility: CLINIC | Age: 45
End: 2025-07-25

## 2025-07-25 NOTE — TELEPHONE ENCOUNTER
Caller: akhil Miller  Doctor/office: Dr Banerjee  CB#: [phone]    % of improvement: pt stated a little bit ( provided pt with the percentage numbers)    Pain Scale (1-10): 6    Pt stated that she is having a little pain in the shoulder area.